# Patient Record
Sex: FEMALE | Race: WHITE | Employment: OTHER | URBAN - METROPOLITAN AREA
[De-identification: names, ages, dates, MRNs, and addresses within clinical notes are randomized per-mention and may not be internally consistent; named-entity substitution may affect disease eponyms.]

---

## 2017-06-26 ENCOUNTER — HOSPITAL ENCOUNTER (OUTPATIENT)
Dept: GENERAL RADIOLOGY | Age: 67
Discharge: HOME | End: 2017-06-26
Attending: FAMILY MEDICINE
Payer: MEDICARE

## 2017-06-26 DIAGNOSIS — M54.50 LUMBAGO: ICD-10-CM

## 2017-06-26 PROCEDURE — 72114 X-RAY EXAM L-S SPINE BENDING: CPT

## 2017-09-11 ENCOUNTER — HOSPITAL ENCOUNTER (OUTPATIENT)
Dept: GENERAL RADIOLOGY | Age: 67
Discharge: HOME | End: 2017-09-11
Attending: FAMILY MEDICINE
Payer: MEDICARE

## 2017-09-11 DIAGNOSIS — M25.561 RIGHT MEDIAL KNEE PAIN: ICD-10-CM

## 2017-09-11 PROCEDURE — 73562 X-RAY EXAM OF KNEE 3: CPT

## 2017-11-03 ENCOUNTER — APPOINTMENT (OUTPATIENT)
Dept: CT IMAGING | Age: 67
End: 2017-11-03
Attending: EMERGENCY MEDICINE
Payer: MEDICARE

## 2017-11-03 ENCOUNTER — HOSPITAL ENCOUNTER (EMERGENCY)
Age: 67
Discharge: HOME | End: 2017-11-04
Attending: EMERGENCY MEDICINE | Admitting: EMERGENCY MEDICINE
Payer: MEDICARE

## 2017-11-03 DIAGNOSIS — R41.82 ALTERED MENTAL STATUS, UNSPECIFIED ALTERED MENTAL STATUS TYPE: Primary | ICD-10-CM

## 2017-11-03 DIAGNOSIS — F10.929 ALCOHOLIC INTOXICATION WITH COMPLICATION (HCC): ICD-10-CM

## 2017-11-03 LAB
ALBUMIN SERPL-MCNC: 3.5 G/DL (ref 3.5–5)
ALBUMIN/GLOB SERPL: 1 {RATIO} (ref 1.1–2.2)
ALP SERPL-CCNC: 61 U/L (ref 45–117)
ALT SERPL-CCNC: 22 U/L (ref 12–78)
AMPHET UR QL SCN: NEGATIVE
ANION GAP SERPL CALC-SCNC: 5 MMOL/L (ref 5–15)
APAP SERPL-MCNC: <2 UG/ML (ref 10–30)
APPEARANCE UR: CLEAR
AST SERPL-CCNC: 20 U/L (ref 15–37)
BACTERIA URNS QL MICRO: NEGATIVE /HPF
BARBITURATES UR QL SCN: NEGATIVE
BASOPHILS # BLD: 0 K/UL (ref 0–0.1)
BASOPHILS NFR BLD: 0 % (ref 0–1)
BENZODIAZ UR QL: POSITIVE
BILIRUB SERPL-MCNC: 0.2 MG/DL (ref 0.2–1)
BILIRUB UR QL: NEGATIVE
BUN SERPL-MCNC: 5 MG/DL (ref 6–20)
BUN/CREAT SERPL: 9 (ref 12–20)
CALCIUM SERPL-MCNC: 8.3 MG/DL (ref 8.5–10.1)
CANNABINOIDS UR QL SCN: NEGATIVE
CHLORIDE SERPL-SCNC: 91 MMOL/L (ref 97–108)
CO2 SERPL-SCNC: 30 MMOL/L (ref 21–32)
COCAINE UR QL SCN: NEGATIVE
COLOR UR: ABNORMAL
CREAT SERPL-MCNC: 0.55 MG/DL (ref 0.55–1.02)
DRUG SCRN COMMENT,DRGCM: ABNORMAL
EOSINOPHIL # BLD: 0.2 K/UL (ref 0–0.4)
EOSINOPHIL NFR BLD: 2 % (ref 0–7)
EPITH CASTS URNS QL MICRO: ABNORMAL /LPF
ERYTHROCYTE [DISTWIDTH] IN BLOOD BY AUTOMATED COUNT: 12.8 % (ref 11.5–14.5)
ETHANOL SERPL-MCNC: 273 MG/DL
GLOBULIN SER CALC-MCNC: 3.5 G/DL (ref 2–4)
GLUCOSE SERPL-MCNC: 98 MG/DL (ref 65–100)
GLUCOSE UR STRIP.AUTO-MCNC: NEGATIVE MG/DL
HCT VFR BLD AUTO: 33.8 % (ref 35–47)
HGB BLD-MCNC: 12.1 G/DL (ref 11.5–16)
HGB UR QL STRIP: ABNORMAL
KETONES UR QL STRIP.AUTO: NEGATIVE MG/DL
LEUKOCYTE ESTERASE UR QL STRIP.AUTO: NEGATIVE
LYMPHOCYTES # BLD: 3.4 K/UL (ref 0.8–3.5)
LYMPHOCYTES NFR BLD: 47 % (ref 12–49)
MCH RBC QN AUTO: 33.2 PG (ref 26–34)
MCHC RBC AUTO-ENTMCNC: 35.8 G/DL (ref 30–36.5)
MCV RBC AUTO: 92.6 FL (ref 80–99)
METHADONE UR QL: NEGATIVE
MONOCYTES # BLD: 0.5 K/UL (ref 0–1)
MONOCYTES NFR BLD: 6 % (ref 5–13)
NEUTS SEG # BLD: 3.3 K/UL (ref 1.8–8)
NEUTS SEG NFR BLD: 45 % (ref 32–75)
NITRITE UR QL STRIP.AUTO: NEGATIVE
OPIATES UR QL: NEGATIVE
PCP UR QL: NEGATIVE
PH UR STRIP: 5.5 [PH] (ref 5–8)
PLATELET # BLD AUTO: 257 K/UL (ref 150–400)
POTASSIUM SERPL-SCNC: 4.6 MMOL/L (ref 3.5–5.1)
PROT SERPL-MCNC: 7 G/DL (ref 6.4–8.2)
PROT UR STRIP-MCNC: NEGATIVE MG/DL
RBC # BLD AUTO: 3.65 M/UL (ref 3.8–5.2)
RBC #/AREA URNS HPF: ABNORMAL /HPF (ref 0–5)
SALICYLATES SERPL-MCNC: <1.7 MG/DL (ref 2.8–20)
SODIUM SERPL-SCNC: 126 MMOL/L (ref 136–145)
SP GR UR REFRACTOMETRY: <1.005 (ref 1–1.03)
UR CULT HOLD, URHOLD: NORMAL
UROBILINOGEN UR QL STRIP.AUTO: 0.2 EU/DL (ref 0.2–1)
WBC # BLD AUTO: 7.4 K/UL (ref 3.6–11)
WBC URNS QL MICRO: ABNORMAL /HPF (ref 0–4)

## 2017-11-03 PROCEDURE — 36415 COLL VENOUS BLD VENIPUNCTURE: CPT | Performed by: EMERGENCY MEDICINE

## 2017-11-03 PROCEDURE — 99284 EMERGENCY DEPT VISIT MOD MDM: CPT

## 2017-11-03 PROCEDURE — 85025 COMPLETE CBC W/AUTO DIFF WBC: CPT | Performed by: EMERGENCY MEDICINE

## 2017-11-03 PROCEDURE — 81001 URINALYSIS AUTO W/SCOPE: CPT | Performed by: EMERGENCY MEDICINE

## 2017-11-03 PROCEDURE — 77030011943

## 2017-11-03 PROCEDURE — 80053 COMPREHEN METABOLIC PANEL: CPT | Performed by: EMERGENCY MEDICINE

## 2017-11-03 PROCEDURE — 51701 INSERT BLADDER CATHETER: CPT

## 2017-11-03 PROCEDURE — 80307 DRUG TEST PRSMV CHEM ANLYZR: CPT | Performed by: EMERGENCY MEDICINE

## 2017-11-03 PROCEDURE — 70450 CT HEAD/BRAIN W/O DYE: CPT

## 2017-11-03 RX ORDER — SODIUM CHLORIDE 0.9 % (FLUSH) 0.9 %
5-10 SYRINGE (ML) INJECTION EVERY 8 HOURS
Status: DISCONTINUED | OUTPATIENT
Start: 2017-11-03 | End: 2017-11-04 | Stop reason: HOSPADM

## 2017-11-03 RX ORDER — SODIUM CHLORIDE 0.9 % (FLUSH) 0.9 %
5-10 SYRINGE (ML) INJECTION AS NEEDED
Status: DISCONTINUED | OUTPATIENT
Start: 2017-11-03 | End: 2017-11-04 | Stop reason: HOSPADM

## 2017-11-04 VITALS
RESPIRATION RATE: 16 BRPM | BODY MASS INDEX: 27.32 KG/M2 | WEIGHT: 170 LBS | TEMPERATURE: 98.2 F | OXYGEN SATURATION: 92 % | SYSTOLIC BLOOD PRESSURE: 95 MMHG | HEIGHT: 66 IN | DIASTOLIC BLOOD PRESSURE: 57 MMHG | HEART RATE: 75 BPM

## 2017-11-04 NOTE — ED PROVIDER NOTES
HPI Comments: 75 y.o. female with past medical history significant for Hypercholesterolemia, Anxiety who presents from home with chief complaint of weakness. Pt reports 1 day hx of gradually worsening weakness to right leg accompanied by mild pain. She noticed symptoms were most prevelant tonight while ambulating to the restroom.  notes that pt was also slurring her speech. Pt states that she did attend a  today for her mother. She further admits to comsuming several glasses of wine today. Pt denies chest pain, SOB. There are no other acute medical concerns at this time. PCP: Georgia Badillo MD    Note written by Nasim Marie, as dictated by Jaz Haque MD 10:35 PM    The history is provided by the patient. No  was used. Past Medical History:   Diagnosis Date    Anxiety     High cholesterol        No past surgical history on file. No family history on file. Social History     Social History    Marital status:      Spouse name: N/A    Number of children: N/A    Years of education: N/A     Occupational History    Not on file. Social History Main Topics    Smoking status: Never Smoker    Smokeless tobacco: Not on file    Alcohol use Yes      Comment: wine    Drug use: Not on file    Sexual activity: Not on file     Other Topics Concern    Not on file     Social History Narrative    No narrative on file         ALLERGIES: Review of patient's allergies indicates no known allergies. Review of Systems   Constitutional: Negative for chills and fever. HENT: Negative for ear pain and sore throat. Eyes: Negative for photophobia and pain. Respiratory: Negative for chest tightness and shortness of breath. Cardiovascular: Negative for chest pain and leg swelling. Gastrointestinal: Negative for abdominal pain, nausea and vomiting. Genitourinary: Negative for dysuria and flank pain.    Musculoskeletal: Positive for myalgias (right leg). Negative for back pain and neck pain. Skin: Negative for rash and wound. Neurological: Positive for speech difficulty (slurred) and weakness (right leg). Negative for dizziness, light-headedness and headaches. Psychiatric/Behavioral:        Sad - she just buried her mother today       Vitals:    11/03/17 2156 11/03/17 2230   BP: (!) 140/115 124/73   Pulse: 79 73   Resp: 16 18   Temp: 98.2 °F (36.8 °C)    SpO2: 96% 95%   Weight: 77.1 kg (170 lb)    Height: 5' 6\" (1.676 m)             Physical Exam   Constitutional: She is oriented to person, place, and time. She appears well-developed and well-nourished. No distress. HENT:   Head: Normocephalic and atraumatic. Eyes: Conjunctivae and EOM are normal.   Neck: Normal range of motion. Cardiovascular: Normal rate, regular rhythm, normal heart sounds and intact distal pulses. No murmur heard. Pulmonary/Chest: Effort normal and breath sounds normal. No stridor. No respiratory distress. Abdominal: Soft. Bowel sounds are normal. There is no tenderness. Musculoskeletal: Normal range of motion. She exhibits no edema, tenderness or deformity. Neurological: She is alert and oriented to person, place, and time. No cranial nerve deficit. Coordination abnormal.   Patient having trouble holding her right leg in the air as well as the left. Skin: Skin is warm and dry. She is not diaphoretic. Psychiatric: Her speech is slurred. She exhibits a depressed mood. She expresses no suicidal plans and no homicidal plans. Nursing note and vitals reviewed. MDM  Number of Diagnoses or Management Options  Alcoholic intoxication with complication [facility]): Altered mental status, unspecified altered mental status type:   Diagnosis management comments: Patient appears intoxicated - check labs and head CT for altered mental status.     Unlikely this is a CVA or other structural problem,       Patient test results discussed and she takes valium (took some today) and her alcohol level was elevated       Amount and/or Complexity of Data Reviewed  Clinical lab tests: reviewed and ordered  Tests in the radiology section of CPT®: reviewed and ordered    Risk of Complications, Morbidity, and/or Mortality  Presenting problems: high  Diagnostic procedures: high  Management options: high    Patient Progress  Patient progress: stable    ED Course       Procedures           VITALS:   Patient Vitals for the past 8 hrs:   Temp Pulse Resp BP SpO2   11/04/17 0000 - 75 16 95/57 92 %   11/03/17 2345 - 73 16 95/58 92 %   11/03/17 2330 - 73 16 100/58 92 %   11/03/17 2315 - 75 17 107/62 95 %   11/03/17 2300 - 79 16 112/59 -   11/03/17 2245 - 72 20 109/64 90 %   11/03/17 2230 - 73 18 124/73 95 %   11/03/17 2215 - 77 17 128/80 95 %   11/03/17 2200 - 78 26 - 95 %   11/03/17 2156 98.2 °F (36.8 °C) 79 16 (!) 140/115 96 %                  Recent Results (from the past 24 hour(s))   CBC WITH AUTOMATED DIFF    Collection Time: 11/03/17 10:05 PM   Result Value Ref Range    WBC 7.4 3.6 - 11.0 K/uL    RBC 3.65 (L) 3.80 - 5.20 M/uL    HGB 12.1 11.5 - 16.0 g/dL    HCT 33.8 (L) 35.0 - 47.0 %    MCV 92.6 80.0 - 99.0 FL    MCH 33.2 26.0 - 34.0 PG    MCHC 35.8 30.0 - 36.5 g/dL    RDW 12.8 11.5 - 14.5 %    PLATELET 727 336 - 408 K/uL    NEUTROPHILS 45 32 - 75 %    LYMPHOCYTES 47 12 - 49 %    MONOCYTES 6 5 - 13 %    EOSINOPHILS 2 0 - 7 %    BASOPHILS 0 0 - 1 %    ABS. NEUTROPHILS 3.3 1.8 - 8.0 K/UL    ABS. LYMPHOCYTES 3.4 0.8 - 3.5 K/UL    ABS. MONOCYTES 0.5 0.0 - 1.0 K/UL    ABS. EOSINOPHILS 0.2 0.0 - 0.4 K/UL    ABS.  BASOPHILS 0.0 0.0 - 0.1 K/UL   ETHYL ALCOHOL    Collection Time: 11/03/17 10:05 PM   Result Value Ref Range    ALCOHOL(ETHYL),SERUM 273 (H) <40 MG/DL   METABOLIC PANEL, COMPREHENSIVE    Collection Time: 11/03/17 10:05 PM   Result Value Ref Range    Sodium 126 (L) 136 - 145 mmol/L    Potassium 4.6 3.5 - 5.1 mmol/L    Chloride 91 (L) 97 - 108 mmol/L    CO2 30 21 - 32 mmol/L Anion gap 5 5 - 15 mmol/L    Glucose 98 65 - 100 mg/dL    BUN 5 (L) 6 - 20 MG/DL    Creatinine 0.55 0.55 - 1.02 MG/DL    BUN/Creatinine ratio 9 (L) 12 - 20      GFR est AA >60 >60 ml/min/1.73m2    GFR est non-AA >60 >60 ml/min/1.73m2    Calcium 8.3 (L) 8.5 - 10.1 MG/DL    Bilirubin, total 0.2 0.2 - 1.0 MG/DL    ALT (SGPT) 22 12 - 78 U/L    AST (SGOT) 20 15 - 37 U/L    Alk.  phosphatase 61 45 - 117 U/L    Protein, total 7.0 6.4 - 8.2 g/dL    Albumin 3.5 3.5 - 5.0 g/dL    Globulin 3.5 2.0 - 4.0 g/dL    A-G Ratio 1.0 (L) 1.1 - 2.2     SALICYLATE    Collection Time: 11/03/17 10:05 PM   Result Value Ref Range    Salicylate level <4.3 (L) 2.8 - 20.0 MG/DL   ACETAMINOPHEN    Collection Time: 11/03/17 10:05 PM   Result Value Ref Range    Acetaminophen level <2 (L) 10 - 30 ug/mL   DRUG SCREEN, URINE    Collection Time: 11/03/17 10:24 PM   Result Value Ref Range    AMPHETAMINES NEGATIVE  NEG      BARBITURATES NEGATIVE  NEG      BENZODIAZEPINES POSITIVE (A) NEG      COCAINE NEGATIVE  NEG      METHADONE NEGATIVE  NEG      OPIATES NEGATIVE  NEG      PCP(PHENCYCLIDINE) NEGATIVE  NEG      THC (TH-CANNABINOL) NEGATIVE  NEG      Drug screen comment (NOTE)    URINALYSIS W/MICROSCOPIC    Collection Time: 11/03/17 10:24 PM   Result Value Ref Range    Color YELLOW/STRAW      Appearance CLEAR CLEAR      Specific gravity <1.005 1.003 - 1.030    pH (UA) 5.5 5.0 - 8.0      Protein NEGATIVE  NEG mg/dL    Glucose NEGATIVE  NEG mg/dL    Ketone NEGATIVE  NEG mg/dL    Bilirubin NEGATIVE  NEG      Blood MODERATE (A) NEG      Urobilinogen 0.2 0.2 - 1.0 EU/dL    Nitrites NEGATIVE  NEG      Leukocyte Esterase NEGATIVE  NEG      WBC 0-4 0 - 4 /hpf    RBC 0-5 0 - 5 /hpf    Epithelial cells FEW FEW /lpf    Bacteria NEGATIVE  NEG /hpf   URINE CULTURE HOLD SAMPLE    Collection Time: 11/03/17 10:24 PM   Result Value Ref Range    Urine culture hold URINE ON HOLD IN MICROBIOLOGY DEPT FOR 3 DAYS         Ct Head Wo Cont    Result Date: 11/3/2017  EXAM:  CT HEAD WO CONT INDICATION:   right leg weakness COMPARISON: None. CONTRAST:  None. TECHNIQUE: Unenhanced CT of the head was performed using 5 mm images. Brain and bone windows were generated. CT dose reduction was achieved through use of a standardized protocol tailored for this examination and automatic exposure control for dose modulation. FINDINGS: There is no extra-axial fluid collection, hemorrhage shift or masses her. Mild atrophy and white matter disease. impression: No acute changes.

## 2017-11-04 NOTE — ED TRIAGE NOTES
Complains of LLE pain after [place] , denies numbness, tingling.   Per pt she had four glasses of wine this afternoon and per her  he does not think she is prounouncing words correctly

## 2017-11-04 NOTE — DISCHARGE INSTRUCTIONS
VITALS:   Patient Vitals for the past 8 hrs:   Temp Pulse Resp BP SpO2   11/03/17 2230 - 73 18 124/73 95 %   11/03/17 2156 98.2 °F (36.8 °C) 79 16 (!) 140/115 96 %                  Recent Results (from the past 24 hour(s))   CBC WITH AUTOMATED DIFF    Collection Time: 11/03/17 10:05 PM   Result Value Ref Range    WBC 7.4 3.6 - 11.0 K/uL    RBC 3.65 (L) 3.80 - 5.20 M/uL    HGB 12.1 11.5 - 16.0 g/dL    HCT 33.8 (L) 35.0 - 47.0 %    MCV 92.6 80.0 - 99.0 FL    MCH 33.2 26.0 - 34.0 PG    MCHC 35.8 30.0 - 36.5 g/dL    RDW 12.8 11.5 - 14.5 %    PLATELET 562 351 - 347 K/uL    NEUTROPHILS 45 32 - 75 %    LYMPHOCYTES 47 12 - 49 %    MONOCYTES 6 5 - 13 %    EOSINOPHILS 2 0 - 7 %    BASOPHILS 0 0 - 1 %    ABS. NEUTROPHILS 3.3 1.8 - 8.0 K/UL    ABS. LYMPHOCYTES 3.4 0.8 - 3.5 K/UL    ABS. MONOCYTES 0.5 0.0 - 1.0 K/UL    ABS. EOSINOPHILS 0.2 0.0 - 0.4 K/UL    ABS. BASOPHILS 0.0 0.0 - 0.1 K/UL   ETHYL ALCOHOL    Collection Time: 11/03/17 10:05 PM   Result Value Ref Range    ALCOHOL(ETHYL),SERUM 273 (H) <68 MG/DL   METABOLIC PANEL, COMPREHENSIVE    Collection Time: 11/03/17 10:05 PM   Result Value Ref Range    Sodium 126 (L) 136 - 145 mmol/L    Potassium 4.6 3.5 - 5.1 mmol/L    Chloride 91 (L) 97 - 108 mmol/L    CO2 30 21 - 32 mmol/L    Anion gap 5 5 - 15 mmol/L    Glucose 98 65 - 100 mg/dL    BUN 5 (L) 6 - 20 MG/DL    Creatinine 0.55 0.55 - 1.02 MG/DL    BUN/Creatinine ratio 9 (L) 12 - 20      GFR est AA >60 >60 ml/min/1.73m2    GFR est non-AA >60 >60 ml/min/1.73m2    Calcium 8.3 (L) 8.5 - 10.1 MG/DL    Bilirubin, total 0.2 0.2 - 1.0 MG/DL    ALT (SGPT) 22 12 - 78 U/L    AST (SGOT) 20 15 - 37 U/L    Alk.  phosphatase 61 45 - 117 U/L    Protein, total 7.0 6.4 - 8.2 g/dL    Albumin 3.5 3.5 - 5.0 g/dL    Globulin 3.5 2.0 - 4.0 g/dL    A-G Ratio 1.0 (L) 1.1 - 2.2     SALICYLATE    Collection Time: 11/03/17 10:05 PM   Result Value Ref Range    Salicylate level <5.0 (L) 2.8 - 20.0 MG/DL   ACETAMINOPHEN    Collection Time: 11/03/17 10:05 PM   Result Value Ref Range    Acetaminophen level <2 (L) 10 - 30 ug/mL   DRUG SCREEN, URINE    Collection Time: 11/03/17 10:24 PM   Result Value Ref Range    AMPHETAMINES NEGATIVE  NEG      BARBITURATES NEGATIVE  NEG      BENZODIAZEPINES POSITIVE (A) NEG      COCAINE NEGATIVE  NEG      METHADONE NEGATIVE  NEG      OPIATES NEGATIVE  NEG      PCP(PHENCYCLIDINE) NEGATIVE  NEG      THC (TH-CANNABINOL) NEGATIVE  NEG      Drug screen comment (NOTE)    URINALYSIS W/MICROSCOPIC    Collection Time: 11/03/17 10:24 PM   Result Value Ref Range    Color YELLOW/STRAW      Appearance CLEAR CLEAR      Specific gravity <1.005 1.003 - 1.030    pH (UA) 5.5 5.0 - 8.0      Protein NEGATIVE  NEG mg/dL    Glucose NEGATIVE  NEG mg/dL    Ketone NEGATIVE  NEG mg/dL    Bilirubin NEGATIVE  NEG      Blood MODERATE (A) NEG      Urobilinogen 0.2 0.2 - 1.0 EU/dL    Nitrites NEGATIVE  NEG      Leukocyte Esterase NEGATIVE  NEG      WBC 0-4 0 - 4 /hpf    RBC 0-5 0 - 5 /hpf    Epithelial cells FEW FEW /lpf    Bacteria NEGATIVE  NEG /hpf   URINE CULTURE HOLD SAMPLE    Collection Time: 11/03/17 10:24 PM   Result Value Ref Range    Urine culture hold URINE ON HOLD IN MICROBIOLOGY DEPT FOR 3 DAYS         Ct Head Wo Cont    Result Date: 11/3/2017  EXAM:  CT HEAD WO CONT INDICATION:   right leg weakness COMPARISON: None. CONTRAST:  None. TECHNIQUE: Unenhanced CT of the head was performed using 5 mm images. Brain and bone windows were generated. CT dose reduction was achieved through use of a standardized protocol tailored for this examination and automatic exposure control for dose modulation. FINDINGS: There is no extra-axial fluid collection, hemorrhage shift or masses her. Mild atrophy and white matter disease. impression: No acute changes.

## 2017-11-04 NOTE — ED NOTES
Bedside shift change report given to Arnold Schultz (oncoming nurse) by Lorie Bailey (offgoing nurse). Report included the following information SBAR, Kardex, ED Summary, MAR, Recent Results and Med Rec Status.

## 2018-12-14 ENCOUNTER — HOSPITAL ENCOUNTER (OUTPATIENT)
Dept: GENERAL RADIOLOGY | Age: 68
Discharge: HOME | End: 2018-12-14
Attending: PHYSICAL MEDICINE & REHABILITATION
Payer: MEDICARE

## 2018-12-14 DIAGNOSIS — R52 PAIN: ICD-10-CM

## 2018-12-14 PROCEDURE — 72072 X-RAY EXAM THORAC SPINE 3VWS: CPT

## 2018-12-14 PROCEDURE — 72050 X-RAY EXAM NECK SPINE 4/5VWS: CPT

## 2019-01-07 ENCOUNTER — HOSPITAL ENCOUNTER (OUTPATIENT)
Dept: MRI IMAGING | Age: 69
Discharge: HOME | End: 2019-01-07
Attending: PHYSICAL MEDICINE & REHABILITATION
Payer: MEDICARE

## 2019-01-07 DIAGNOSIS — M54.50 LOW BACK PAIN: ICD-10-CM

## 2019-01-07 PROCEDURE — 72148 MRI LUMBAR SPINE W/O DYE: CPT

## 2020-09-22 ENCOUNTER — HOSPITAL ENCOUNTER (OUTPATIENT)
Dept: CT IMAGING | Age: 70
Discharge: HOME | End: 2020-09-22
Attending: FAMILY MEDICINE
Payer: MEDICARE

## 2020-09-22 DIAGNOSIS — R51.9 FRONTAL HEADACHE: ICD-10-CM

## 2020-09-22 PROCEDURE — 70450 CT HEAD/BRAIN W/O DYE: CPT

## 2021-01-29 ENCOUNTER — OFFICE VISIT (OUTPATIENT)
Dept: NEUROLOGY | Age: 71
End: 2021-01-29
Payer: MEDICARE

## 2021-01-29 VITALS
HEIGHT: 66 IN | RESPIRATION RATE: 18 BRPM | DIASTOLIC BLOOD PRESSURE: 70 MMHG | SYSTOLIC BLOOD PRESSURE: 122 MMHG | WEIGHT: 166 LBS | TEMPERATURE: 97.7 F | BODY MASS INDEX: 26.68 KG/M2

## 2021-01-29 DIAGNOSIS — G25.0 ESSENTIAL TREMOR: Primary | ICD-10-CM

## 2021-01-29 PROCEDURE — 3017F COLORECTAL CA SCREEN DOC REV: CPT | Performed by: PSYCHIATRY & NEUROLOGY

## 2021-01-29 PROCEDURE — G8419 CALC BMI OUT NRM PARAM NOF/U: HCPCS | Performed by: PSYCHIATRY & NEUROLOGY

## 2021-01-29 PROCEDURE — G8427 DOCREV CUR MEDS BY ELIG CLIN: HCPCS | Performed by: PSYCHIATRY & NEUROLOGY

## 2021-01-29 PROCEDURE — 1090F PRES/ABSN URINE INCON ASSESS: CPT | Performed by: PSYCHIATRY & NEUROLOGY

## 2021-01-29 PROCEDURE — 1101F PT FALLS ASSESS-DOCD LE1/YR: CPT | Performed by: PSYCHIATRY & NEUROLOGY

## 2021-01-29 PROCEDURE — G8400 PT W/DXA NO RESULTS DOC: HCPCS | Performed by: PSYCHIATRY & NEUROLOGY

## 2021-01-29 PROCEDURE — G8432 DEP SCR NOT DOC, RNG: HCPCS | Performed by: PSYCHIATRY & NEUROLOGY

## 2021-01-29 PROCEDURE — 99204 OFFICE O/P NEW MOD 45 MIN: CPT | Performed by: PSYCHIATRY & NEUROLOGY

## 2021-01-29 PROCEDURE — G8536 NO DOC ELDER MAL SCRN: HCPCS | Performed by: PSYCHIATRY & NEUROLOGY

## 2021-01-29 RX ORDER — TOPIRAMATE 25 MG/1
TABLET ORAL
Qty: 42 TAB | Refills: 0 | Status: SHIPPED | OUTPATIENT
Start: 2021-01-29 | End: 2021-05-04 | Stop reason: DRUGHIGH

## 2021-01-29 RX ORDER — TOPIRAMATE 100 MG/1
100 TABLET, FILM COATED ORAL
Qty: 30 TAB | Refills: 3 | Status: SHIPPED | OUTPATIENT
Start: 2021-01-29 | End: 2021-05-04 | Stop reason: SDUPTHER

## 2021-01-29 RX ORDER — ACYCLOVIR 200 MG/1
200 CAPSULE ORAL
COMMUNITY

## 2021-01-29 NOTE — PROGRESS NOTES
[facility] and [address] at [facility] at [address] [facility], [address] [address] [id number] [address] [facility], [address] [place] [address]  [phone] Office  [phone] Facsimile           Referring: Chadd Landon MD      Chief Complaint   Patient presents with    Tremors     hands    59-year-old right-handed lady who presents today for initial neurologic consultation regarding tremor. She notes that she really has become bothered by this tremor over the last couple of months. No changes in medication, illness, injury. She describes tremor in her hands when she is doing something. It tends to get worse during the day. Worse with anxiety. She notices it a good deal if she holds her stylus for her phone. No difficulty with make-up. No head tremor. No vocal tremor. Her  has Parkinson's. No family history of tremor. She does have anxiety and notes if she takes a Valium which she does on rare occasion it will make the tremor better. She does not drink anymore so she does not know how alcohol affects. No rest tremor. She does have myoclonus at night particularly and uses trazodone at bedtime. Record review finds no recent documentation in our electronic medical record    CT scan of the head performed September 22, 2020 is personally reviewed.   This is normal.  Past Medical History:   Diagnosis Date    Adverse effect of anesthesia     per PCP patient had bladder retention after anesthesia with skin cancer surgery, under treatment with urologist. 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  BETHANECHOL CHLORIDE 25 MG TABS (URECHOLINE) Take 25 mg by mouth two (2) times a day.  omeprazole (PRILOSEC) 40 mg capsule Take 40 mg by mouth daily.  lisinopril (PRINIVIL, ZESTRIL) 10 mg tablet Take 5 mg by mouth daily.  traZODone (DESYREL) 100 mg tablet Take 100 mg by mouth nightly. Indications: insomnia      diazePAM (VALIUM) 5 mg tablet Take 10 mg by mouth every twelve (12) hours as needed for Anxiety.  escitalopram oxalate (LEXAPRO) 10 mg tablet Take 10 mg by mouth daily.  latanoprost (XALATAN) 0.005 % ophthalmic solution Administer 1 Drop to both eyes nightly.  peg 400-propylene glycol (SYSTANE, PROPYLENE GLYCOL,) 0.4-0.3 % drop Administer 1 Drop to both eyes as needed.  denosumab (PROLIA) 60 mg/mL injection 60 mg by SubCUTAneous route every 6 months. Indications: POST-MENOPAUSAL OSTEOPOROSIS      human papillomavirus vaccine QV, PF, (GARDASIL, PF,) 20-40-40-20 mcg/0.5 mL injection 0.5 mL by IntraMUSCular route once. Allergies   Allergen Reactions    Ciprofibrate Myalgia and Other (comments)     Sore and aching all over       Social History     Tobacco Use    Smoking status: Never Smoker    Smokeless tobacco: Never Used   Substance Use Topics    Alcohol use: Yes     Alcohol/week: 14.0 standard drinks     Types: 14 Glasses of wine per week     Comment: wine    Drug use: Not on file       No family history on file.     Review of Systems  Pertinent positives and negatives as noted with remainder of comprehensive review negative      Examination  Visit Vitals  Temp 97.7 °F (36.5 °C) Ht 5' 6\" (1.676 m)   Wt 75.3 kg (166 lb)   BMI 26.79 kg/m²     Visit Vitals  /70   Temp 97.7 °F (36.5 °C)   Resp 18   Ht 5' 6\" (1.676 m)   Wt 75.3 kg (166 lb)   BMI 26.79 kg/m²     Pleasant lady. No icterus. No edema. Appropriately dressed and appropriately groomed and she is engaging and interactive. Cranial nerves intact 2-12 with the exception of markedly decreased vision in the left eye and she notes she is legally blind from central  occlusion. No nystagmus. No pronation or drift. Postural tremor of the outstretched hands. Intention on finger-nose-finger. No rest tremor. No cogwheeling. Writing sample finds quiver with spiral as well as regular handwriting. Strength is full throughout. Reflexes symmetrical.  No pathological reflexes. No ataxia. Impression/Plan  Essential tremor. Discussed the difference between Parkinson's disease and essential tremor. Discussed the benign but bothersome course. Discussed potential treatment modalities  Including Mysoline and Inderal as well as their potential side effects. Discussed also this is a disorder that does not need to be treated unless bothersome enough to take a daily medication and the medications do nothing but treat the symptoms, not the disorder--ie no cure. She would like to try medication as this is quite bothersome to her. After review of medications, history of blood pressure etc. we will try Topamax. Would like to defer Mysoline this point. Also would like to defer beta-blocker. Titrate in a customary fashion 200 mg nightly. Discussed potential side effects including taste disturbance, tingling in the extremities and weight loss. Follow in 8 weeks    Velta Burkitt, MD          This note was created using voice recognition software. Despite editing, there may be syntax errors.

## 2021-01-29 NOTE — PATIENT INSTRUCTIONS
To help your tremor, start taking Topamax 25 mg at bedtime for 1 week then 50 mg at bedtime for 1 week then 75 mg at bedtime for 1 week then start taking 100 mg nightly thereafter    Maintain 100 mg nightly of Topamax until we get back together in about 8 weeks and at that time we will determine if we need to increase the dose or maintain

## 2021-02-23 ENCOUNTER — TELEPHONE (OUTPATIENT)
Dept: NEUROLOGY | Age: 71
End: 2021-02-23

## 2021-02-23 NOTE — TELEPHONE ENCOUNTER
----- Message from [facility] sent at 2/22/2021  4:18 PM EST -----  Regarding: Dr. Perez Nicole first and last name:n/a  Reason for call:Pt wants to know if fax  request for \"Topirmate \" 100 mg was received and wanted to know if she needs an office visit or if RX can be sent to [address] [place] [address]. Pt also said this Rx has improved her tremors significantly.   Callback required yes/no and why: yes  Best contact number(s): [phone]  Details to clarify the request: n/a

## 2021-02-23 NOTE — TELEPHONE ENCOUNTER
S/w pt, notified tpx 100 mg was sent and rec'd at [facility] Receipt confirmed by pharmacy (1/29/2021  9:32 AM EST)

## 2021-04-14 ENCOUNTER — HOSPITAL ENCOUNTER (OUTPATIENT)
Dept: GENERAL RADIOLOGY | Age: 71
Discharge: HOME | End: 2021-04-14
Payer: MEDICARE

## 2021-04-14 ENCOUNTER — TRANSCRIBE ORDER (OUTPATIENT)
Dept: MRI IMAGING | Age: 71
End: 2021-04-14

## 2021-04-14 DIAGNOSIS — R06.02 SHORTNESS OF BREATH: ICD-10-CM

## 2021-04-14 DIAGNOSIS — R06.02 SHORTNESS OF BREATH: Primary | ICD-10-CM

## 2021-04-14 PROCEDURE — 71046 X-RAY EXAM CHEST 2 VIEWS: CPT

## 2021-05-04 ENCOUNTER — OFFICE VISIT (OUTPATIENT)
Dept: NEUROLOGY | Age: 71
End: 2021-05-04
Payer: MEDICARE

## 2021-05-04 VITALS
RESPIRATION RATE: 16 BRPM | BODY MASS INDEX: 26.68 KG/M2 | HEIGHT: 66 IN | WEIGHT: 166 LBS | DIASTOLIC BLOOD PRESSURE: 70 MMHG | OXYGEN SATURATION: 97 % | HEART RATE: 75 BPM | SYSTOLIC BLOOD PRESSURE: 109 MMHG

## 2021-05-04 DIAGNOSIS — G25.0 ESSENTIAL TREMOR: Primary | ICD-10-CM

## 2021-05-04 PROCEDURE — 1101F PT FALLS ASSESS-DOCD LE1/YR: CPT | Performed by: PSYCHIATRY & NEUROLOGY

## 2021-05-04 PROCEDURE — G8510 SCR DEP NEG, NO PLAN REQD: HCPCS | Performed by: PSYCHIATRY & NEUROLOGY

## 2021-05-04 PROCEDURE — G8419 CALC BMI OUT NRM PARAM NOF/U: HCPCS | Performed by: PSYCHIATRY & NEUROLOGY

## 2021-05-04 PROCEDURE — 1090F PRES/ABSN URINE INCON ASSESS: CPT | Performed by: PSYCHIATRY & NEUROLOGY

## 2021-05-04 PROCEDURE — G8400 PT W/DXA NO RESULTS DOC: HCPCS | Performed by: PSYCHIATRY & NEUROLOGY

## 2021-05-04 PROCEDURE — G8427 DOCREV CUR MEDS BY ELIG CLIN: HCPCS | Performed by: PSYCHIATRY & NEUROLOGY

## 2021-05-04 PROCEDURE — 3017F COLORECTAL CA SCREEN DOC REV: CPT | Performed by: PSYCHIATRY & NEUROLOGY

## 2021-05-04 PROCEDURE — G8536 NO DOC ELDER MAL SCRN: HCPCS | Performed by: PSYCHIATRY & NEUROLOGY

## 2021-05-04 PROCEDURE — 99213 OFFICE O/P EST LOW 20 MIN: CPT | Performed by: PSYCHIATRY & NEUROLOGY

## 2021-05-04 RX ORDER — GABAPENTIN 300 MG/1
CAPSULE ORAL
COMMUNITY
Start: 2021-04-12

## 2021-05-04 RX ORDER — TOPIRAMATE 100 MG/1
100 TABLET, FILM COATED ORAL
Qty: 90 TAB | Refills: 1 | Status: SHIPPED | OUTPATIENT
Start: 2021-05-04 | End: 2022-02-02

## 2021-05-04 NOTE — PROGRESS NOTES
[facility] and [address] at [facility] at [address] [facility], [address] [facility] [address] [facility], [address] [place] [address]   [phone]              Chief Complaint   Patient presents with   [facility] Tremors     doing very well     Current Outpatient Medications   Medication Sig Dispense Refill    topiramate (Topamax) 100 mg tablet Take 1 Tab by mouth nightly. 90 Tab 1    acyclovir (ZOVIRAX) 200 mg capsule Take  by mouth every four (4) hours (while awake).  BETHANECHOL CHLORIDE 25 MG TABS (URECHOLINE) Take 25 mg by mouth two (2) times a day.  omeprazole (PRILOSEC) 40 mg capsule Take 40 mg by mouth daily.  lisinopril (PRINIVIL, ZESTRIL) 10 mg tablet Take 5 mg by mouth daily.  traZODone (DESYREL) 100 mg tablet Take 100 mg by mouth nightly. Indications: insomnia      diazePAM (VALIUM) 5 mg tablet Take 10 mg by mouth every twelve (12) hours as needed for Anxiety.  escitalopram oxalate (LEXAPRO) 10 mg tablet Take 10 mg by mouth daily.  latanoprost (XALATAN) 0.005 % ophthalmic solution Administer 1 Drop to both eyes nightly.  peg 400-propylene glycol (SYSTANE, PROPYLENE GLYCOL,) 0.4-0.3 % drop Administer 1 Drop to both eyes as needed.  denosumab (PROLIA) 60 mg/mL injection 60 mg by SubCUTAneous route every 6 months. Indications: POST-MENOPAUSAL OSTEOPOROSIS      human papillomavirus vaccine QV, PF, (GARDASIL, PF,) 20-40-40-20 mcg/0.5 mL injection 0.5 mL by IntraMUSCular route once.  gabapentin (NEURONTIN) 300 mg capsule TAKE 1 CAPSULE BY MOUTH THREE TIMES DAILY        Allergies   Allergen Reactions    Ciprofibrate Myalgia and Other (comments)     Sore and aching all over    Diclofenac Nausea and Vomiting     Social History     Tobacco Use    Smoking status: Never Smoker    Smokeless tobacco: Never Used   Substance Use Topics    Alcohol use:  Yes     Alcohol/week: 14.0 standard drinks     Types: 14 Glasses of wine per week     Comment: wine    Drug use: Not on file     69-year-old lady returns today for follow-up essential tremor. Today she comes after initial consultation where we were titrating max. We subsequently titrated up to a dose of 100 mg nightly. She is tolerating that without difficulty. Happy with how her tremor is doing. Notes that she can use her stylus when she is checking her email and she is quite happy. Dr. Africa Rojas will be doing a spinal fusion with her in the next 30-60 days    Examination  Visit Vitals  /70 (BP 1 Location: Left upper arm, BP Patient Position: Sitting, BP Cuff Size: Adult)   Pulse 75   Resp 16   Ht 5' 6\" (1.676 m)   Wt 75.3 kg (166 lb)   SpO2 97%   BMI 26.79 kg/m²   She is a very pleasant lady is awake alert oriented and conversant. Speech and language normal.  Cognition normal.  Cranial nerves intact. No visible tremor today. No intention. She is able to mimic the use of a stylus in the office today. No tremor with drawing a spiral.    Impression/Plan  Essential tremor doing well. Continue Topamax 100 mg nightly. Follow in the fall unless circumstances dictate otherwise    Art Vasquez MD      This note was created using voice recognition software. Despite editing, there may be syntax errors.

## 2021-05-11 ENCOUNTER — TELEPHONE (OUTPATIENT)
Dept: NEUROLOGY | Age: 71
End: 2021-05-11

## 2021-05-11 RX ORDER — ROSUVASTATIN CALCIUM 10 MG/1
10 TABLET, COATED ORAL
COMMUNITY

## 2021-05-11 NOTE — TELEPHONE ENCOUNTER
Called patient she was concerned that care gaps were not filled out properly( her PCP is not in the [employer] system). Her biggest issue was that she was going to be getting surgery and she felt that the information needed to be updated. I updated what I was able to in the Care gaps, I also placed a note in her chart and updated her medication based on patient information as she stated that some of the medication information was incorrect.

## 2021-05-11 NOTE — TELEPHONE ENCOUNTER
----- Message from Major Persaud sent at 5/11/2021 11:07 AM EDT -----  Regarding: Dr. Evelia Pinzon Message/Vendor Calls    Caller's first and last name: Pt      Reason for call: incorrect information on AVS (per pt)      Callback required yes/no and why: Yes      Best contact number(s): [phone]      Details to clarify the request: Pt is requesting to speak w/ someone in the office regarding her AVS from her last appt on 05/04/21 with Dr. Silvia Delatorre. She states there is incorrect information on the report she was given following her appt with Dr. Silvia Delatorre on 05/04/21. She states this is extremely urgent. She has a pending spinal surgery that this information may negatively effect. She is requesting a call back.        Major Persaud

## 2021-05-11 NOTE — PROGRESS NOTES
Patient called concerning screening per Patient she received her COVID shot - 1st dose on 2/25/21 and her second  Moderna shot March 2021. She no longer has to complete breast examinations due to her age. Shingrix vaccination was done on 10/12/2012    Her annual wellness was completed on 2/11/2021    Colorectal screening was completed on 11/17/2014 and was to be done again in 10 years. I attempted to update the screening, however I am not a PCP office and for the most part had to just notes and add it to this addendum. Updated alcohol use as the patient states that she no longer drinks any alcoholic beverages. I have also updated her medication list as she states that their were mistakes in that as well. I can not add anything else to her care gaps as it does not allow me to edit it.

## 2022-02-22 ENCOUNTER — OFFICE VISIT (OUTPATIENT)
Dept: NEUROLOGY | Age: 72
End: 2022-02-22
Payer: MEDICARE

## 2022-02-22 VITALS
BODY MASS INDEX: 26.79 KG/M2 | RESPIRATION RATE: 18 BRPM | HEART RATE: 83 BPM | WEIGHT: 166 LBS | SYSTOLIC BLOOD PRESSURE: 122 MMHG | OXYGEN SATURATION: 98 % | DIASTOLIC BLOOD PRESSURE: 80 MMHG

## 2022-02-22 DIAGNOSIS — F10.20 ALCOHOLISM (HCC): Primary | ICD-10-CM

## 2022-02-22 DIAGNOSIS — G25.0 ESSENTIAL TREMOR: ICD-10-CM

## 2022-02-22 PROCEDURE — 1101F PT FALLS ASSESS-DOCD LE1/YR: CPT | Performed by: PSYCHIATRY & NEUROLOGY

## 2022-02-22 PROCEDURE — 1090F PRES/ABSN URINE INCON ASSESS: CPT | Performed by: PSYCHIATRY & NEUROLOGY

## 2022-02-22 PROCEDURE — G8536 NO DOC ELDER MAL SCRN: HCPCS | Performed by: PSYCHIATRY & NEUROLOGY

## 2022-02-22 PROCEDURE — G8427 DOCREV CUR MEDS BY ELIG CLIN: HCPCS | Performed by: PSYCHIATRY & NEUROLOGY

## 2022-02-22 PROCEDURE — 99214 OFFICE O/P EST MOD 30 MIN: CPT | Performed by: PSYCHIATRY & NEUROLOGY

## 2022-02-22 PROCEDURE — 3017F COLORECTAL CA SCREEN DOC REV: CPT | Performed by: PSYCHIATRY & NEUROLOGY

## 2022-02-22 PROCEDURE — G8419 CALC BMI OUT NRM PARAM NOF/U: HCPCS | Performed by: PSYCHIATRY & NEUROLOGY

## 2022-02-22 PROCEDURE — G8432 DEP SCR NOT DOC, RNG: HCPCS | Performed by: PSYCHIATRY & NEUROLOGY

## 2022-02-22 PROCEDURE — G8399 PT W/DXA RESULTS DOCUMENT: HCPCS | Performed by: PSYCHIATRY & NEUROLOGY

## 2022-02-22 NOTE — PROGRESS NOTES
[facility] and [address] at [facility] at [address], [address] [facility] [address] [facility], [address] [place] [address]   [phone]              Chief Complaint   Patient presents with    Tremors     Current Outpatient Medications   Medication Sig Dispense Refill    topiramate (TOPAMAX) 100 mg tablet Take 1 tablet by mouth nightly 30 Tablet 5    rosuvastatin (Crestor) 10 mg tablet Take 10 mg by mouth nightly.  gabapentin (NEURONTIN) 300 mg capsule TAKE 1 CAPSULE BY MOUTH THREE TIMES DAILY      acyclovir (ZOVIRAX) 200 mg capsule Take 200 mg by mouth. Indications: per patient she takes 1 daily      BETHANECHOL CHLORIDE 25 MG TABS (URECHOLINE) Take 25 mg by mouth two (2) times a day.  omeprazole (PRILOSEC) 40 mg capsule Take 40 mg by mouth daily.  lisinopril (PRINIVIL, ZESTRIL) 10 mg tablet Take 5 mg by mouth daily.  traZODone (DESYREL) 100 mg tablet Take 100 mg by mouth nightly. Indications: insomnia      diazePAM (VALIUM) 5 mg tablet Take 10 mg by mouth every twelve (12) hours as needed for Anxiety. Indications: Per Patient she takes 0.5 tablet as needed      escitalopram oxalate (LEXAPRO) 10 mg tablet Take 10 mg by mouth daily. Indications: Patient is NOT TAKING MEDICATION      latanoprost (XALATAN) 0.005 % ophthalmic solution Administer 1 Drop to both eyes nightly.  peg 400-propylene glycol (SYSTANE, PROPYLENE GLYCOL,) 0.4-0.3 % drop Administer 1 Drop to both eyes as needed.  denosumab (PROLIA) 60 mg/mL injection 60 mg by SubCUTAneous route every 6 months.  Indications: POST-MENOPAUSAL OSTEOPOROSIS        Allergies   Allergen Reactions    Ciprofibrate Myalgia and Other (comments)     Sore and aching all over    Diclofenac Nausea and Vomiting     Social History     Tobacco Use    Smoking status: Never Smoker    Smokeless tobacco: Never Used   Substance Use Topics  Alcohol use: Not Currently     Alcohol/week: 0.0 standard drinks     Comment: none    Drug use: Not on file   65-year-old lady returns today for follow-up essential tremor. She was last seen May 2021 and she was maintained on Topamax 100 mg. Today she reports the tremor is doing well. She is here with her  who provides history as well. New is that of her alcoholism. [facility]. She had surgery in September and did not have any alcohol until December.  notes that she would have airline size bottles and hide them in the car and starting at 11 AM would go out sneaking drinks. He now has restricted that to 4 bottles (Airline bottle size) a day and he does not give her her first 1 until 4 PM.  He notes that after she has had 2 drinks that she does not recall how many she has had. At times she will get very confused when she drinks and he actually had to call EMS one time last year because of that. They were hoping to enroll in our alcohol cessation program and I discussed with him that we do not do that here but would certainly be happy to refer      Examination  Visit Vitals  /80 (BP 1 Location: Left upper arm, BP Patient Position: Sitting, BP Cuff Size: Adult)   Pulse 83   Resp 18   Wt 75.3 kg (166 lb)   SpO2 98%   BMI 26.79 kg/m²     Pleasant lady. Awake alert and oriented. Normal speech and language. No tremor today visible. No intention. Impression/Plan  Essential tremor doing well  Continue Topamax    Alcoholism  Will refer to behavioral health    Follow with me in 6 months    Armando Galicia MD        This note was created using voice recognition software. Despite editing, there may be syntax errors.

## 2022-02-22 NOTE — PATIENT INSTRUCTIONS
[facility] office  Phone: [phone] [address]  Teresa Benson [address]    [facility]    [address] [facility] [place] [address]  Sophia Benson [address]    Phone : [phone]    [facility]    Address: [address] [place] [address]    Phone: [phone]

## 2022-10-22 DIAGNOSIS — G25.0 ESSENTIAL TREMOR: ICD-10-CM

## 2022-10-24 RX ORDER — TOPIRAMATE 100 MG/1
TABLET, FILM COATED ORAL
Qty: 30 TABLET | Refills: 0 | Status: SHIPPED | OUTPATIENT
Start: 2022-10-24 | End: 2022-11-16

## 2023-01-26 ENCOUNTER — TRANSCRIBE ORDER (OUTPATIENT)
Dept: SCHEDULING | Age: 73
End: 2023-01-26

## 2023-01-26 DIAGNOSIS — R07.9 CHEST PAIN: Primary | ICD-10-CM

## 2023-01-26 DIAGNOSIS — R63.4 WEIGHT LOSS: ICD-10-CM

## 2023-01-27 ENCOUNTER — HOSPITAL ENCOUNTER (OUTPATIENT)
Dept: CT IMAGING | Age: 73
Discharge: HOME | End: 2023-01-27
Attending: FAMILY MEDICINE
Payer: MEDICARE

## 2023-01-27 DIAGNOSIS — R63.4 WEIGHT LOSS: ICD-10-CM

## 2023-01-27 DIAGNOSIS — R07.9 CHEST PAIN: ICD-10-CM

## 2023-01-27 PROCEDURE — 82565 ASSAY OF CREATININE: CPT

## 2023-01-27 PROCEDURE — 74011000636 HC RX REV CODE- 636: Performed by: STUDENT IN AN ORGANIZED HEALTH CARE EDUCATION/TRAINING PROGRAM

## 2023-01-27 PROCEDURE — 74177 CT ABD & PELVIS W/CONTRAST: CPT

## 2023-01-27 RX ADMIN — IOPAMIDOL 100 ML: 755 INJECTION, SOLUTION INTRAVENOUS at 10:35

## 2023-01-28 LAB — CREAT BLD-MCNC: 0.7 MG/DL (ref 0.6–1.3)

## 2023-01-30 ENCOUNTER — TRANSCRIBE ORDER (OUTPATIENT)
Dept: SCHEDULING | Age: 73
End: 2023-01-30

## 2023-01-30 DIAGNOSIS — R92.1 BREAST CALCIFICATION, LEFT: Primary | ICD-10-CM

## 2023-01-30 DIAGNOSIS — R92.1 BREAST CALCIFICATION, RIGHT: ICD-10-CM

## 2023-01-30 DIAGNOSIS — R92.2 DENSE BREAST TISSUE: ICD-10-CM

## 2023-01-31 ENCOUNTER — TRANSCRIBE ORDER (OUTPATIENT)
Dept: SCHEDULING | Age: 73
End: 2023-01-31

## 2023-01-31 DIAGNOSIS — R92.1 BREAST CALCIFICATION, LEFT: Primary | ICD-10-CM

## 2023-02-01 ENCOUNTER — TRANSCRIBE ORDER (OUTPATIENT)
Dept: MAMMOGRAPHY | Age: 73
End: 2023-02-01

## 2023-02-01 DIAGNOSIS — Z12.31 VISIT FOR SCREENING MAMMOGRAM: Primary | ICD-10-CM

## 2023-02-06 ENCOUNTER — HOSPITAL ENCOUNTER (OUTPATIENT)
Dept: MAMMOGRAPHY | Age: 73
Discharge: HOME | End: 2023-02-06
Attending: FAMILY MEDICINE
Payer: MEDICARE

## 2023-02-06 ENCOUNTER — HOSPITAL ENCOUNTER (OUTPATIENT)
Dept: MAMMOGRAPHY | Age: 73
End: 2023-02-06
Attending: FAMILY MEDICINE
Payer: MEDICARE

## 2023-02-06 DIAGNOSIS — Z12.31 VISIT FOR SCREENING MAMMOGRAM: ICD-10-CM

## 2023-02-06 PROCEDURE — 77063 BREAST TOMOSYNTHESIS BI: CPT

## 2023-03-21 ENCOUNTER — HOSPITAL ENCOUNTER (INPATIENT)
Age: 73
LOS: 3 days | Discharge: NURSING, REHAB OR LONG-TERM CARE | End: 2023-03-24
Attending: STUDENT IN AN ORGANIZED HEALTH CARE EDUCATION/TRAINING PROGRAM | Admitting: INTERNAL MEDICINE
Payer: MEDICARE

## 2023-03-21 ENCOUNTER — APPOINTMENT (OUTPATIENT)
Dept: CT IMAGING | Age: 73
End: 2023-03-21
Attending: STUDENT IN AN ORGANIZED HEALTH CARE EDUCATION/TRAINING PROGRAM
Payer: MEDICARE

## 2023-03-21 ENCOUNTER — APPOINTMENT (OUTPATIENT)
Dept: CT IMAGING | Age: 73
End: 2023-03-21
Attending: INTERNAL MEDICINE
Payer: MEDICARE

## 2023-03-21 ENCOUNTER — APPOINTMENT (OUTPATIENT)
Dept: GENERAL RADIOLOGY | Age: 73
End: 2023-03-21
Attending: STUDENT IN AN ORGANIZED HEALTH CARE EDUCATION/TRAINING PROGRAM
Payer: MEDICARE

## 2023-03-21 ENCOUNTER — APPOINTMENT (OUTPATIENT)
Dept: GENERAL RADIOLOGY | Age: 73
End: 2023-03-21
Attending: INTERNAL MEDICINE
Payer: MEDICARE
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
## 2023-03-21 DIAGNOSIS — S32.592A FRACTURE OF MULTIPLE PUBIC RAMI, LEFT, CLOSED, INITIAL ENCOUNTER (HCC): ICD-10-CM

## 2023-03-21 PROBLEM — S32.599A PUBIC RAMUS FRACTURE (HCC): Status: ACTIVE | Noted: 2023-03-21

## 2023-03-21 LAB
ANION GAP SERPL CALC-SCNC: 9 MMOL/L (ref 5–15)
APPEARANCE UR: CLEAR
B PERT DNA SPEC QL NAA+PROBE: NOT DETECTED
BACTERIA URNS QL MICRO: NEGATIVE /HPF
BASOPHILS # BLD: 0 K/UL (ref 0–0.1)
BASOPHILS NFR BLD: 1 % (ref 0–1)
BILIRUB UR QL: NEGATIVE
BORDETELLA PARAPERTUSSIS PCR, BORPAR: NOT DETECTED
BUN SERPL-MCNC: 6 MG/DL (ref 6–20)
BUN/CREAT SERPL: 10 (ref 12–20)
C PNEUM DNA SPEC QL NAA+PROBE: NOT DETECTED
CALCIUM SERPL-MCNC: 7.8 MG/DL (ref 8.5–10.1)
CHLORIDE SERPL-SCNC: 101 MMOL/L (ref 97–108)
CO2 SERPL-SCNC: 26 MMOL/L (ref 21–32)
COLOR UR: ABNORMAL
CREAT SERPL-MCNC: 0.59 MG/DL (ref 0.55–1.02)
DIFFERENTIAL METHOD BLD: ABNORMAL
EOSINOPHIL # BLD: 0.1 K/UL (ref 0–0.4)
EOSINOPHIL NFR BLD: 1 % (ref 0–7)
EPITH CASTS URNS QL MICRO: ABNORMAL /LPF
ERYTHROCYTE [DISTWIDTH] IN BLOOD BY AUTOMATED COUNT: 13.9 % (ref 11.5–14.5)
FLUAV SUBTYP SPEC NAA+PROBE: NOT DETECTED
FLUBV RNA SPEC QL NAA+PROBE: NOT DETECTED
GLUCOSE SERPL-MCNC: 113 MG/DL (ref 65–100)
GLUCOSE UR STRIP.AUTO-MCNC: NEGATIVE MG/DL
HADV DNA SPEC QL NAA+PROBE: NOT DETECTED
HCOV 229E RNA SPEC QL NAA+PROBE: NOT DETECTED
HCOV HKU1 RNA SPEC QL NAA+PROBE: NOT DETECTED
HCOV NL63 RNA SPEC QL NAA+PROBE: NOT DETECTED
HCOV OC43 RNA SPEC QL NAA+PROBE: NOT DETECTED
HCT VFR BLD AUTO: 37.8 % (ref 35–47)
HGB BLD-MCNC: 13 G/DL (ref 11.5–16)
HGB UR QL STRIP: ABNORMAL
HMPV RNA SPEC QL NAA+PROBE: NOT DETECTED
HPIV1 RNA SPEC QL NAA+PROBE: NOT DETECTED
HPIV2 RNA SPEC QL NAA+PROBE: NOT DETECTED
HPIV3 RNA SPEC QL NAA+PROBE: NOT DETECTED
HPIV4 RNA SPEC QL NAA+PROBE: NOT DETECTED
IMM GRANULOCYTES # BLD AUTO: 0.1 K/UL (ref 0–0.04)
IMM GRANULOCYTES NFR BLD AUTO: 1 % (ref 0–0.5)
KETONES UR QL STRIP.AUTO: NEGATIVE MG/DL
LEUKOCYTE ESTERASE UR QL STRIP.AUTO: ABNORMAL
LYMPHOCYTES # BLD: 1.2 K/UL (ref 0.8–3.5)
LYMPHOCYTES NFR BLD: 15 % (ref 12–49)
M PNEUMO DNA SPEC QL NAA+PROBE: NOT DETECTED
MCH RBC QN AUTO: 34 PG (ref 26–34)
MCHC RBC AUTO-ENTMCNC: 34.4 G/DL (ref 30–36.5)
MCV RBC AUTO: 99 FL (ref 80–99)
MONOCYTES # BLD: 0.6 K/UL (ref 0–1)
MONOCYTES NFR BLD: 7 % (ref 5–13)
NEUTS SEG # BLD: 6.2 K/UL (ref 1.8–8)
NEUTS SEG NFR BLD: 76 % (ref 32–75)
NITRITE UR QL STRIP.AUTO: NEGATIVE
NRBC # BLD: 0 K/UL (ref 0–0.01)
NRBC BLD-RTO: 0 PER 100 WBC
PH UR STRIP: 7.5 (ref 5–8)
PLATELET # BLD AUTO: 167 K/UL (ref 150–400)
PMV BLD AUTO: 9.9 FL (ref 8.9–12.9)
POTASSIUM SERPL-SCNC: 3.4 MMOL/L (ref 3.5–5.1)
PROT UR STRIP-MCNC: NEGATIVE MG/DL
RBC # BLD AUTO: 3.82 M/UL (ref 3.8–5.2)
RBC #/AREA URNS HPF: ABNORMAL /HPF (ref 0–5)
RSV RNA SPEC QL NAA+PROBE: NOT DETECTED
RV+EV RNA SPEC QL NAA+PROBE: NOT DETECTED
SARS-COV-2 RNA RESP QL NAA+PROBE: NOT DETECTED
SODIUM SERPL-SCNC: 136 MMOL/L (ref 136–145)
SP GR UR REFRACTOMETRY: 1 (ref 1–1.03)
UA: UC IF INDICATED,UAUC: ABNORMAL
UROBILINOGEN UR QL STRIP.AUTO: 0.2 EU/DL (ref 0.2–1)
WBC # BLD AUTO: 8.2 K/UL (ref 3.6–11)
WBC URNS QL MICRO: ABNORMAL /HPF (ref 0–4)

## 2023-03-21 PROCEDURE — 73502 X-RAY EXAM HIP UNI 2-3 VIEWS: CPT

## 2023-03-21 PROCEDURE — 74011250637 HC RX REV CODE- 250/637: Performed by: INTERNAL MEDICINE

## 2023-03-21 PROCEDURE — 81001 URINALYSIS AUTO W/SCOPE: CPT

## 2023-03-21 PROCEDURE — 96374 THER/PROPH/DIAG INJ IV PUSH: CPT

## 2023-03-21 PROCEDURE — 65270000029 HC RM PRIVATE

## 2023-03-21 PROCEDURE — 74011000250 HC RX REV CODE- 250: Performed by: INTERNAL MEDICINE

## 2023-03-21 PROCEDURE — 80048 BASIC METABOLIC PNL TOTAL CA: CPT

## 2023-03-21 PROCEDURE — 74011250636 HC RX REV CODE- 250/636: Performed by: STUDENT IN AN ORGANIZED HEALTH CARE EDUCATION/TRAINING PROGRAM

## 2023-03-21 PROCEDURE — 71101 X-RAY EXAM UNILAT RIBS/CHEST: CPT

## 2023-03-21 PROCEDURE — 74011250636 HC RX REV CODE- 250/636: Performed by: INTERNAL MEDICINE

## 2023-03-21 PROCEDURE — 85025 COMPLETE CBC W/AUTO DIFF WBC: CPT

## 2023-03-21 PROCEDURE — 0202U NFCT DS 22 TRGT SARS-COV-2: CPT

## 2023-03-21 PROCEDURE — 73552 X-RAY EXAM OF FEMUR 2/>: CPT

## 2023-03-21 PROCEDURE — 71045 X-RAY EXAM CHEST 1 VIEW: CPT

## 2023-03-21 PROCEDURE — 72192 CT PELVIS W/O DYE: CPT

## 2023-03-21 PROCEDURE — 99285 EMERGENCY DEPT VISIT HI MDM: CPT

## 2023-03-21 PROCEDURE — 73200 CT UPPER EXTREMITY W/O DYE: CPT

## 2023-03-21 PROCEDURE — 36415 COLL VENOUS BLD VENIPUNCTURE: CPT

## 2023-03-21 PROCEDURE — 73030 X-RAY EXAM OF SHOULDER: CPT

## 2023-03-21 PROCEDURE — 70450 CT HEAD/BRAIN W/O DYE: CPT

## 2023-03-21 PROCEDURE — 72125 CT NECK SPINE W/O DYE: CPT

## 2023-03-21 PROCEDURE — 51702 INSERT TEMP BLADDER CATH: CPT

## 2023-03-21 PROCEDURE — 96375 TX/PRO/DX INJ NEW DRUG ADDON: CPT

## 2023-03-21 RX ORDER — ACETAMINOPHEN 325 MG/1
650 TABLET ORAL EVERY 6 HOURS
Status: DISCONTINUED | OUTPATIENT
Start: 2023-03-21 | End: 2023-03-24 | Stop reason: HOSPADM

## 2023-03-21 RX ORDER — NALTREXONE HYDROCHLORIDE 50 MG/1
50 TABLET, FILM COATED ORAL DAILY
COMMUNITY

## 2023-03-21 RX ORDER — PANTOPRAZOLE SODIUM 40 MG/1
40 TABLET, DELAYED RELEASE ORAL
Status: DISCONTINUED | OUTPATIENT
Start: 2023-03-22 | End: 2023-03-24 | Stop reason: HOSPADM

## 2023-03-21 RX ORDER — OXYCODONE HYDROCHLORIDE 5 MG/1
5 TABLET ORAL
Status: DISCONTINUED | OUTPATIENT
Start: 2023-03-21 | End: 2023-03-24 | Stop reason: HOSPADM

## 2023-03-21 RX ORDER — NALOXONE HYDROCHLORIDE 0.4 MG/ML
0.4 INJECTION, SOLUTION INTRAMUSCULAR; INTRAVENOUS; SUBCUTANEOUS AS NEEDED
Status: DISCONTINUED | OUTPATIENT
Start: 2023-03-21 | End: 2023-03-24 | Stop reason: HOSPADM

## 2023-03-21 RX ORDER — GLUCOSAMINE/CHONDR SU A SOD 750-600 MG
TABLET ORAL
COMMUNITY
End: 2023-03-24

## 2023-03-21 RX ORDER — AA/PROT/LYSINE/METHIO/VIT C/B6 50-12.5 MG
TABLET ORAL
COMMUNITY
End: 2023-03-24

## 2023-03-21 RX ORDER — ONDANSETRON 2 MG/ML
4 INJECTION INTRAMUSCULAR; INTRAVENOUS
Status: DISCONTINUED | OUTPATIENT
Start: 2023-03-21 | End: 2023-03-24 | Stop reason: HOSPADM

## 2023-03-21 RX ORDER — TOPIRAMATE 25 MG/1
100 TABLET ORAL
Status: DISCONTINUED | OUTPATIENT
Start: 2023-03-21 | End: 2023-03-24 | Stop reason: HOSPADM

## 2023-03-21 RX ORDER — CYCLOSPORINE 0.5 MG/ML
1 EMULSION OPHTHALMIC EVERY 12 HOURS
COMMUNITY
End: 2023-03-24

## 2023-03-21 RX ORDER — SODIUM CHLORIDE 0.9 % (FLUSH) 0.9 %
5-40 SYRINGE (ML) INJECTION AS NEEDED
Status: DISCONTINUED | OUTPATIENT
Start: 2023-03-21 | End: 2023-03-24 | Stop reason: HOSPADM

## 2023-03-21 RX ORDER — ROSUVASTATIN CALCIUM 10 MG/1
10 TABLET, COATED ORAL
Status: DISCONTINUED | OUTPATIENT
Start: 2023-03-21 | End: 2023-03-24 | Stop reason: HOSPADM

## 2023-03-21 RX ORDER — SODIUM CHLORIDE, SODIUM LACTATE, POTASSIUM CHLORIDE, CALCIUM CHLORIDE 600; 310; 30; 20 MG/100ML; MG/100ML; MG/100ML; MG/100ML
75 INJECTION, SOLUTION INTRAVENOUS CONTINUOUS
Status: DISCONTINUED | OUTPATIENT
Start: 2023-03-21 | End: 2023-03-24 | Stop reason: HOSPADM

## 2023-03-21 RX ORDER — NALTREXONE HYDROCHLORIDE 50 MG/1
50 TABLET, FILM COATED ORAL DAILY
Status: DISCONTINUED | OUTPATIENT
Start: 2023-03-22 | End: 2023-03-24 | Stop reason: HOSPADM

## 2023-03-21 RX ORDER — MORPHINE SULFATE 2 MG/ML
2 INJECTION, SOLUTION INTRAMUSCULAR; INTRAVENOUS
Status: DISCONTINUED | OUTPATIENT
Start: 2023-03-21 | End: 2023-03-24 | Stop reason: HOSPADM

## 2023-03-21 RX ORDER — HYDROMORPHONE HYDROCHLORIDE 1 MG/ML
0.5 INJECTION, SOLUTION INTRAMUSCULAR; INTRAVENOUS; SUBCUTANEOUS ONCE
Status: COMPLETED | OUTPATIENT
Start: 2023-03-21 | End: 2023-03-21

## 2023-03-21 RX ORDER — BUSPIRONE HYDROCHLORIDE 30 MG/1
30 TABLET ORAL 2 TIMES DAILY
COMMUNITY

## 2023-03-21 RX ORDER — ALUMINUM ZIRCONIUM OCTACHLOROHYDREX GLY 16 G/100G
GEL TOPICAL
COMMUNITY
End: 2023-03-24

## 2023-03-21 RX ORDER — DOCUSATE SODIUM 100 MG/1
100 CAPSULE, LIQUID FILLED ORAL 2 TIMES DAILY
Status: DISCONTINUED | OUTPATIENT
Start: 2023-03-21 | End: 2023-03-24 | Stop reason: HOSPADM

## 2023-03-21 RX ORDER — FENTANYL CITRATE 50 UG/ML
25 INJECTION, SOLUTION INTRAMUSCULAR; INTRAVENOUS
Status: COMPLETED | OUTPATIENT
Start: 2023-03-21 | End: 2023-03-21

## 2023-03-21 RX ORDER — BUSPIRONE HYDROCHLORIDE 15 MG/1
30 TABLET ORAL DAILY
Status: DISCONTINUED | OUTPATIENT
Start: 2023-03-22 | End: 2023-03-24 | Stop reason: HOSPADM

## 2023-03-21 RX ORDER — TRAZODONE HYDROCHLORIDE 100 MG/1
300 TABLET ORAL
Status: DISCONTINUED | OUTPATIENT
Start: 2023-03-21 | End: 2023-03-24 | Stop reason: HOSPADM

## 2023-03-21 RX ORDER — GLUCOSAMINE SULFATE 1500 MG
1000 POWDER IN PACKET (EA) ORAL DAILY
COMMUNITY
End: 2023-03-24

## 2023-03-21 RX ORDER — ACYCLOVIR 200 MG/1
200 CAPSULE ORAL DAILY
COMMUNITY

## 2023-03-21 RX ORDER — POTASSIUM CHLORIDE 750 MG/1
40 TABLET, FILM COATED, EXTENDED RELEASE ORAL
Status: COMPLETED | OUTPATIENT
Start: 2023-03-21 | End: 2023-03-21

## 2023-03-21 RX ORDER — ONDANSETRON 2 MG/ML
4 INJECTION INTRAMUSCULAR; INTRAVENOUS ONCE
Status: COMPLETED | OUTPATIENT
Start: 2023-03-21 | End: 2023-03-21

## 2023-03-21 RX ORDER — LATANOPROST 50 UG/ML
1 SOLUTION/ DROPS OPHTHALMIC
Status: DISCONTINUED | OUTPATIENT
Start: 2023-03-21 | End: 2023-03-24 | Stop reason: HOSPADM

## 2023-03-21 RX ORDER — GABAPENTIN 300 MG/1
300 CAPSULE ORAL 3 TIMES DAILY
Status: DISCONTINUED | OUTPATIENT
Start: 2023-03-21 | End: 2023-03-24 | Stop reason: HOSPADM

## 2023-03-21 RX ORDER — CYCLOSPORINE 0.5 MG/ML
1 EMULSION OPHTHALMIC EVERY 12 HOURS
Status: DISCONTINUED | OUTPATIENT
Start: 2023-03-21 | End: 2023-03-22

## 2023-03-21 RX ORDER — DOCUSATE SODIUM 100 MG/1
100 CAPSULE, LIQUID FILLED ORAL AS NEEDED
COMMUNITY

## 2023-03-21 RX ORDER — ACYCLOVIR 200 MG/1
200 CAPSULE ORAL
Status: DISCONTINUED | OUTPATIENT
Start: 2023-03-21 | End: 2023-03-22

## 2023-03-21 RX ORDER — SODIUM CHLORIDE 0.9 % (FLUSH) 0.9 %
5-40 SYRINGE (ML) INJECTION EVERY 8 HOURS
Status: DISCONTINUED | OUTPATIENT
Start: 2023-03-21 | End: 2023-03-24 | Stop reason: HOSPADM

## 2023-03-21 RX ADMIN — SODIUM CHLORIDE, PRESERVATIVE FREE 10 ML: 5 INJECTION INTRAVENOUS at 22:05

## 2023-03-21 RX ADMIN — HYDROMORPHONE HYDROCHLORIDE 0.5 MG: 1 INJECTION, SOLUTION INTRAMUSCULAR; INTRAVENOUS; SUBCUTANEOUS at 14:55

## 2023-03-21 RX ADMIN — POTASSIUM CHLORIDE 40 MEQ: 750 TABLET, EXTENDED RELEASE ORAL at 17:32

## 2023-03-21 RX ADMIN — ACYCLOVIR 200 MG: 200 CAPSULE ORAL at 22:04

## 2023-03-21 RX ADMIN — SODIUM CHLORIDE, PRESERVATIVE FREE 10 ML: 5 INJECTION INTRAVENOUS at 17:33

## 2023-03-21 RX ADMIN — SODIUM CHLORIDE, POTASSIUM CHLORIDE, SODIUM LACTATE AND CALCIUM CHLORIDE 50 ML/HR: 600; 310; 30; 20 INJECTION, SOLUTION INTRAVENOUS at 17:34

## 2023-03-21 RX ADMIN — ROSUVASTATIN CALCIUM 10 MG: 10 TABLET, FILM COATED ORAL at 21:51

## 2023-03-21 RX ADMIN — ACETAMINOPHEN 650 MG: 325 TABLET ORAL at 17:33

## 2023-03-21 RX ADMIN — DOCUSATE SODIUM 100 MG: 100 CAPSULE, LIQUID FILLED ORAL at 19:39

## 2023-03-21 RX ADMIN — TRAZODONE HYDROCHLORIDE 300 MG: 100 TABLET ORAL at 22:07

## 2023-03-21 RX ADMIN — FENTANYL CITRATE 25 MCG: 50 INJECTION, SOLUTION INTRAMUSCULAR; INTRAVENOUS at 11:30

## 2023-03-21 RX ADMIN — LATANOPROST 1 DROP: 50 SOLUTION OPHTHALMIC at 22:06

## 2023-03-21 RX ADMIN — ACYCLOVIR 200 MG: 200 CAPSULE ORAL at 19:39

## 2023-03-21 RX ADMIN — TOPIRAMATE 100 MG: 100 TABLET, FILM COATED ORAL at 22:05

## 2023-03-21 RX ADMIN — ONDANSETRON 4 MG: 2 INJECTION INTRAMUSCULAR; INTRAVENOUS at 11:29

## 2023-03-21 RX ADMIN — GABAPENTIN 300 MG: 300 CAPSULE ORAL at 21:51

## 2023-03-21 NOTE — H&P
[facility] [address]  [address] [facility] [place], [address] [place] [address]  [phone]    Admission History and Physical      NAME:  Judah Andrew   :   [birth year]   MRN:  [id number]     PCP:  Archie Davies MD     Date/Time of service:  3/21/2023  5:24 PM        Subjective:     CHIEF COMPLAINT: fall    HISTORY OF PRESENT ILLNESS:     Ms. Monae Schultz is a 67 y.o.   female with a past medical history of chronic anticoagulation,  GERD, hyperlipidemia, anxiety and depression who is admitted with a fall. Ms. Monae Schultz presented to the Emergency Department today complaining of left-sided hip, shoulder and rib pain. She denies any chest pain, lightheadedness prior to her fall. She states that she is having trouble urinating and often has trouble urinating when she is flat on her back. She denies any recent sickness fever chills.  at bedside. Allergies   Allergen Reactions    Ciprofibrate Myalgia and Other (comments)     Sore and aching all over    Diclofenac Nausea and Vomiting       Prior to Admission medications    Medication Sig Start Date End Date Taking? Authorizing Provider   busPIRone (BUSPAR) 30 mg tablet Take 30 mg by mouth daily. Yes Romain, MD Lizandro   apixaban (Eliquis) 5 mg tablet Take 5 mg by mouth two (2) times a day. Yes Other, MD Lizandro   cycloSPORINE (Restasis) 0.05 % dpet Administer 1 Drop to both eyes every twelve (12) hours. Yes Romain, MD Lizandro   acyclovir (ZOVIRAX) 200 mg capsule Take 200 mg by mouth every four (4) hours (while awake). Yes Romain, MD Lizandro   naltrexone (DEPADE) 50 mg tablet Take 50 mg by mouth daily. Yes Romain, MD Lizandro   docusate sodium (COLACE) 100 mg capsule Take 100 mg by mouth two (2) times a day. Yes Romain, MD Lizandro   cholecalciferol (Vitamin D3) 25 mcg (1,000 unit) cap Take 1,000 Units by mouth daily. Yes Romain, MD Lizandro   Bifidobacterium Infantis (Align) 4 mg cap Take  by mouth.    Yes Romain, MD Lizandro   coenzyme q10 (Co Q-10) 10 mg cap Take  by mouth. Yes Other, MD Lizandro   ginkgo biloba leaf extract 60 mg tab Take  by mouth. Yes Other, MD Lizandro   topiramate (TOPAMAX) 100 mg tablet Take 1 tablet by mouth nightly 11/16/22  Yes Anusha Davenport MD   rosuvastatin (CRESTOR) 10 mg tablet Take 10 mg by mouth nightly. Yes Provider, Historical   gabapentin (NEURONTIN) 300 mg capsule TAKE 1 CAPSULE BY MOUTH THREE TIMES DAILY 4/12/21  Yes Provider, Historical   omeprazole (PRILOSEC) 40 mg capsule Take 40 mg by mouth daily. Yes Provider, Historical   traZODone (DESYREL) 100 mg tablet Take 300 mg by mouth nightly. Indications: insomnia   Yes Provider, Historical   latanoprost (XALATAN) 0.005 % ophthalmic solution Administer 1 Drop to both eyes nightly. Yes Provider, Historical   lisinopril (PRINIVIL, ZESTRIL) 10 mg tablet Take 5 mg by mouth daily. Patient not taking: Reported on 3/21/2023    Provider, Historical   peg 400-propylene glycol (SYSTANE, PROPYLENE GLYCOL,) 0.4-0.3 % drop Administer 1 Drop to both eyes as needed. Provider, Historical   denosumab (PROLIA) 60 mg/mL injection 60 mg by SubCUTAneous route every 6 months.  Indications: POST-MENOPAUSAL OSTEOPOROSIS    Provider, Historical       Past Medical History:   Diagnosis Date    Adverse effect of anesthesia     per PCP patient had bladder retention after anesthesia with skin cancer surgery, under treatment with urologist. Done at [address] [place] [address]     GERD (gastroesophageal reflux disease)     High cholesterol     Ill-defined condition 2008    fell hematoma of head watched in hospital 3 days resolved    Ill-defined condition 2000    no central vision in left eye    Ill-defined condition     osteporosis    Ill-defined condition     warts on hands    Psychiatric disorder     depression and anxiety        Past Surgical History:   Procedure Laterality Date    HX APPENDECTOMY      HX BREAST BIOPSY Bilateral     Age 21 - Benign    HX DILATION AND CURETTAGE      HX OTHER SURGICAL      multiple skin cancers     HX TONSILLECTOMY         Social History     Tobacco Use    Smoking status: Never    Smokeless tobacco: Never   Substance Use Topics    Alcohol use: Not Currently     Alcohol/week: 0.0 standard drinks     Comment: none        History reviewed. No pertinent family history. Review of Systems:  Per HPI         Objective:      VITALS:    Vital signs reviewed; most recent are:    Visit Vitals  /67   Pulse (!) 103   Temp 98 °F (36.7 °C)   Resp 16   Ht 5' 6\" (1.676 m)   Wt 51.7 kg (114 lb)   SpO2 95%   BMI 18.40 kg/m²     SpO2 Readings from Last 6 Encounters:   03/21/23 95%   02/22/22 98%   05/04/21 97%   11/04/17 92%   06/09/12 98%    O2 Flow Rate (L/min): 2 l/min   No intake or output data in the 24 hours ending 03/21/23 1724     Exam:     Physical Exam:    Gen: Elderly, in no acute distress  HEENT:  Pink conjunctivae, PERRL, hearing intact to voice, moist mucous membranes  Neck:  Supple, no tenderness to palpation   Resp:  No accessory muscle use, clear breath sounds without wheezes rales or rhonchi  Card:  RRR, No murmurs, normal S1, S2, no peripheral edema  Abd:  Soft, non-tender, non-distended, normoactive bowel sounds are present  Musc: Limited range of motion of left side  Skin:  No rashes or ulcers, skin turgor is good  Neuro:  Cranial nerves 3-12 are grossly intact, follows commands appropriately  Psych:  Oriented to person, place, and time, Alert with good insight      Labs:    Recent Labs     03/21/23  1342   WBC 8.2   HGB 13.0   HCT 37.8        Recent Labs     03/21/23  1342      K 3.4*      CO2 26   *   BUN 6   CREA 0.59   CA 7.8*     No results found for: GLUCPOC  No results for input(s): PH, PCO2, PO2, HCO3, FIO2 in the last 72 hours. No results for input(s): INR, INREXT in the last 72 hours.     Radiology and EKG reviewed:   CT pelvic showing fractures of left sacral ala, left superior and inferior pubic rami     Old Records reviewed in [address] [facility] Assessment/Plan:       Fall POA: Denies any cardiac or neurological prodrome. PT OT once cleared by Ortho. Left sacral ala and left superior and inferior pubic rami POA: Due to fall. Scheduled Tylenol. As needed oxycodone. As needed IV morphine. Consult orthopedics. Concern for left rotator cuff injury POA: Obtain CT shoulder. Monitor. Respiratory insufficiency POA: Requiring 2 L nasal cannula on admission. Denies any history of pulmonary illnesses. Check chest x-ray. X-ray ribs show no fractures. Check respiratory viral panel. Incentive spirometer. Hx of PE: hold elqiuis pending ortho eval     Hyperlipidemia: Continue statin. Anxiety/depression POA: Continue home Topamax, trazodone and BuSpar.     Glaucoma: continue eyedrops    Risk of deterioration: high      Total time spent with patient: 36 Minutes **I personally saw and examined the patient during this time period**                 Care Plan discussed with: Patient, Family, and Nursing Staff    Discussed:  Care Plan    Prophylaxis:  SCD's, hold home Eliquis pending Ortho eval    Probable Disposition:  SNF/LTC           ___________________________________________________    Attending Physician: Fermin Lundberg DO

## 2023-03-21 NOTE — ED TRIAGE NOTES
Patient presents to treatment area via EMS. Patient states she was going to the bathroom last night using rollator, and fell, hitting the left side of her body on the tile floor. Patient denies striking head or LOC. Complains of left shoulder, rib, and hip pain. Unable to lift left hip.

## 2023-03-21 NOTE — ED NOTES
Dr. Adela Radford rounded on patient, informed her of patient concern to want to urinate but feels like she cannot void, offered [place] does not want to use, patient would prefer to be straight cath, will bladder scan first.

## 2023-03-21 NOTE — ED PROVIDER NOTES
HPI     Date of Service:  3/21/2023    Patient:  Molly Beach    Chief Complaint:  Lina Stallings       HPI:  Molly Beach is a 67 y.o.  female with a past medical history of listed below who presents for evaluation of a fall. Patient reports last night while using her rollator to go to the bathroom, she tripped on the rollator and fell onto her left side.  was able to assist her back into bed. Denies head strike or LOC. + blood thinner use. She is complaining of left shoulder, left-sided rib and left hip pain. She has chronic back pain which is at baseline. No neck pain. Denies chest or abdominal pain. Denies recent illness. Past Medical History:   Diagnosis Date    Adverse effect of anesthesia     per PCP patient had bladder retention after anesthesia with skin cancer surgery, under treatment with urologist. Etelvina Taylor at [address]     GERD (gastroesophageal reflux disease)     High cholesterol     Ill-defined condition 2008    fell hematoma of head watched in hospital 3 days resolved    Ill-defined condition 2000    no central vision in left eye    Ill-defined condition     osteporosis    Ill-defined condition     warts on hands    Psychiatric disorder     depression and anxiety       Past Surgical History:   Procedure Laterality Date    HX APPENDECTOMY      HX BREAST BIOPSY Bilateral     Age 21 - Benign    HX DILATION AND CURETTAGE      HX OTHER SURGICAL      multiple skin cancers     HX TONSILLECTOMY           History reviewed. No pertinent family history.     Social History     Socioeconomic History    Marital status:      Spouse name: Not on file    Number of children: Not on file    Years of education: Not on file    Highest education level: Not on file   Occupational History    Not on file   Tobacco Use    Smoking status: Never    Smokeless tobacco: Never   Substance and Sexual Activity    Alcohol use: Not Currently     Alcohol/week: 0.0 standard drinks     Comment: none    Drug use: Not on file    Sexual activity: Not on file   Other Topics Concern    Not on file   Social History Narrative    Not on file     Social Determinants of Health     Financial Resource Strain: Not on file   Food Insecurity: Not on file   Transportation Needs: Not on file   Physical Activity: Not on file   Stress: Not on file   Social Connections: Not on file   Intimate Partner Violence: Not on file   Housing Stability: Not on file         ALLERGIES: Ciprofibrate and Diclofenac    Review of Systems   Constitutional:  Negative for chills and fever. HENT:  Negative for congestion and rhinorrhea. Eyes:  Negative for discharge and redness. Respiratory:  Negative for cough and shortness of breath. Cardiovascular:  Negative for chest pain. Gastrointestinal:  Negative for abdominal pain, diarrhea, nausea and vomiting. Musculoskeletal:  Positive for arthralgias and back pain. Neurological:  Negative for speech difficulty and headaches. Psychiatric/Behavioral:  Negative for agitation and confusion. Vitals:    03/21/23 1109   BP: 109/78   Pulse: (!) 107   Resp: 20   Temp: 99.2 °F (37.3 °C)   SpO2: 96%   Weight: 51.7 kg (114 lb)            Physical Exam  Vitals and nursing note reviewed. Constitutional:       General: She is in acute distress. Appearance: She is not ill-appearing or toxic-appearing. HENT:      Head: Normocephalic. Eyes:      General: No scleral icterus. Right eye: No discharge. Left eye: No discharge. Conjunctiva/sclera: Conjunctivae normal.   Cardiovascular:      Rate and Rhythm: Normal rate. Pulses: Normal pulses. Pulmonary:      Effort: Pulmonary effort is normal. No respiratory distress. Chest:      Chest wall: Tenderness present. No crepitus. Abdominal:      General: Abdomen is flat. Palpations: Abdomen is soft. Tenderness: There is no abdominal tenderness. Musculoskeletal:      Left shoulder: Tenderness present. No deformity. Normal range of motion. Arms:       Left hip: Tenderness present. Decreased range of motion. Left upper leg: Tenderness present. No edema or deformity. Left foot: Normal capillary refill. Normal pulse. Skin:     General: Skin is warm and dry. Capillary Refill: Capillary refill takes less than 2 seconds. Neurological:      General: No focal deficit present. Mental Status: She is alert and oriented to person, place, and time. Psychiatric:         Mood and Affect: Mood normal.         Behavior: Behavior normal.        Medical Decision Making      DECISION MAKING:  Lesvia Escobar is a 67 y.o. female who comes in as above. On arrival patient is afebrile, vital signs notable for mild tachycardia with a heart rate of 107 bpm.  Vital signs otherwise stable. On my examination she is uncomfortable appearing and in mild distress secondary to pain. There is tenderness palpation over the left lateral mid rib region but no appreciated crepitus or ecchymosis. The left shoulder is tender but she is able to move the arm, there is overlying ecchymosis and no deformity. There is tenderness over the left hip with limited range of motion secondary to pain. No appreciated shortening or rotation of the lower extremity. Neurovascular intact distally. Will obtain x-ray imaging of the shoulder, ribs and hip and pelvis to rule out fracture or dislocation. Patient denies head strike or LOC, normal mentation therefore do not feel imaging of the head is indicated at this time. X-rays of the left ribs and shoulder are negative for fractures or other acute bony abnormalities. X-ray of the left hip and pelvis does show nondisplaced left superior and inferior pubic rami fractures. Patient's case was reviewed with on-call orthopedics at [facility] Flavia Agarwal PA-C; recommends CT of the pelvis for further assessment and will consult at [facility].   Patient's case was subsequently discussed with the hospitalist, Dr. Derrick Sim, at [address] [place] [address] for admission. Perfect Serve Consult for Admission  1:29 PM    ED Room Number: WER07/07  Patient Name and age:  Valery Sender 67 y.o.  female  Working Diagnosis: Fall, initial encounter  (primary encounter diagnosis)  Fracture of multiple pubic rami, left, closed, initial encounter ([address]    COVID-19 Suspicion:  no  Sepsis present:  no  Reassessment needed: no  Code Status:  Full Code  Readmission: no  Isolation Requirements:  no  Recommended Level of Care:  med/surg  Department: [facility] ED - [phone]  Admitting Provider: Dr. Derrick Sim    Other: 71-year-old female with past medical history of hypertension, hyperlipidemia, previous VTE on Eliquis presented for ground-level fall and left hip pain. Also complained of left hip and rib pain. X-rays notable for a left superior and inferior pubic rami fracture. Orthopedics consulted, recommending CT. Admitting for pain control and inability to ambulate. Ortho will see patient in the hospital.    Amount and/or Complexity of Data Reviewed  Labs: ordered. Radiology: ordered. ECG/medicine tests: ordered. Risk  OTC drugs. Prescription drug management. Decision regarding hospitalization. Procedures      LABS:    Recent Results (from the past 24 hour(s))   CBC WITH AUTOMATED DIFF    Collection Time: 03/21/23  1:42 PM   Result Value Ref Range    WBC 8.2 3.6 - 11.0 K/uL    RBC 3.82 3.80 - 5.20 M/uL    HGB 13.0 11.5 - 16.0 g/dL    HCT 37.8 35.0 - 47.0 %    MCV 99.0 80.0 - 99.0 FL    MCH 34.0 26.0 - 34.0 PG    MCHC 34.4 30.0 - 36.5 g/dL    RDW 13.9 11.5 - 14.5 %    PLATELET 696 442 - 582 K/uL    MPV 9.9 8.9 - 12.9 FL    NRBC 0.0 0.0  WBC    ABSOLUTE NRBC 0.00 0.00 - 0.01 K/uL    NEUTROPHILS 76 (H) 32 - 75 %    LYMPHOCYTES 15 12 - 49 %    MONOCYTES 7 5 - 13 %    EOSINOPHILS 1 0 - 7 %    BASOPHILS 1 0 - 1 %    IMMATURE GRANULOCYTES 1 (H) 0 - 0.5 %    ABS. NEUTROPHILS 6.2 1.8 - 8.0 K/UL    ABS. LYMPHOCYTES 1.2 0.8 - 3.5 K/UL    ABS. MONOCYTES 0.6 0.0 - 1.0 K/UL    ABS. EOSINOPHILS 0.1 0.0 - 0.4 K/UL    ABS. BASOPHILS 0.0 0.0 - 0.1 K/UL    ABS. IMM. GRANS. 0.1 (H) 0.00 - 0.04 K/UL    DF AUTOMATED     METABOLIC PANEL, BASIC    Collection Time: 03/21/23  1:42 PM   Result Value Ref Range    Sodium 136 136 - 145 mmol/L    Potassium 3.4 (L) 3.5 - 5.1 mmol/L    Chloride 101 97 - 108 mmol/L    CO2 26 21 - 32 mmol/L    Anion gap 9 5 - 15 mmol/L    Glucose 113 (H) 65 - 100 mg/dL    BUN 6 6 - 20 MG/DL    Creatinine 0.59 0.55 - 1.02 MG/DL    BUN/Creatinine ratio 10 (L) 12 - 20      eGFR >60 >60 ml/min/1.73m2    Calcium 7.8 (L) 8.5 - 10.1 MG/DL       IMAGING:  CT PELV WO CONT   Final Result   1. Insufficiency fractures of the left sacral ala and left superior and inferior   pubic rami         XR RIBS LT W PA CXR MIN 3 V   Final Result   No acute rib fracture or other acute abnormality in the chest.          XR SHOULDER LT AP/LAT MIN 2 V   Final Result   No acute abnormality. Mild degenerative changes. Elevation of the   humeral head suggesting rotator cuff injury. XR HIP LT W OR WO PELV 2-3 VWS   Final Result   Acute nondisplaced fractures of the left superior and inferior pubic rami. XR FEMUR LT 2 V   Final Result   Acute nondisplaced fractures of the left superior and inferior pubic rami. IMPRESSION:  1. Fall, initial encounter    2.  Fracture of multiple pubic rami, left, closed, initial encounter [facility])        DISPOSITION:  Admitted      Delphine Rea DO

## 2023-03-21 NOTE — ED NOTES
Report received from EMS crew, patient transferred from [facility]. Placed patient on cardiac monitor. Pain now 3/10 post pain medication prior to transfer.

## 2023-03-21 NOTE — ED NOTES
TRANSFER - OUT REPORT:    Verbal report given to Juan Delacruz RN(name) on Janelle Barthel  being transferred to [facility] ED(unit) for routine progression of care       Report consisted of patients Situation, Background, Assessment and   Recommendations(SBAR). Information from the following report(s) ED Summary was reviewed with the receiving nurse. Lines:   Peripheral IV 03/21/23 Anterior;Left;Proximal Forearm (Active)        Opportunity for questions and clarification was provided.

## 2023-03-22 LAB
ANION GAP SERPL CALC-SCNC: 5 MMOL/L (ref 5–15)
BASOPHILS # BLD: 0 K/UL (ref 0–0.1)
BASOPHILS NFR BLD: 0 % (ref 0–1)
BUN SERPL-MCNC: 10 MG/DL (ref 6–20)
BUN/CREAT SERPL: 18 (ref 12–20)
CALCIUM SERPL-MCNC: 7.9 MG/DL (ref 8.5–10.1)
CHLORIDE SERPL-SCNC: 105 MMOL/L (ref 97–108)
CO2 SERPL-SCNC: 25 MMOL/L (ref 21–32)
CREAT SERPL-MCNC: 0.57 MG/DL (ref 0.55–1.02)
DIFFERENTIAL METHOD BLD: ABNORMAL
EOSINOPHIL # BLD: 0.2 K/UL (ref 0–0.4)
EOSINOPHIL NFR BLD: 3 % (ref 0–7)
ERYTHROCYTE [DISTWIDTH] IN BLOOD BY AUTOMATED COUNT: 13.9 % (ref 11.5–14.5)
GLUCOSE SERPL-MCNC: 107 MG/DL (ref 65–100)
HCT VFR BLD AUTO: 38.1 % (ref 35–47)
HGB BLD-MCNC: 12.5 G/DL (ref 11.5–16)
IMM GRANULOCYTES # BLD AUTO: 0 K/UL (ref 0–0.04)
IMM GRANULOCYTES NFR BLD AUTO: 1 % (ref 0–0.5)
LYMPHOCYTES # BLD: 1.3 K/UL (ref 0.8–3.5)
LYMPHOCYTES NFR BLD: 19 % (ref 12–49)
MAGNESIUM SERPL-MCNC: 1.9 MG/DL (ref 1.6–2.4)
MCH RBC QN AUTO: 33.9 PG (ref 26–34)
MCHC RBC AUTO-ENTMCNC: 32.8 G/DL (ref 30–36.5)
MCV RBC AUTO: 103.3 FL (ref 80–99)
MONOCYTES # BLD: 0.6 K/UL (ref 0–1)
MONOCYTES NFR BLD: 9 % (ref 5–13)
NEUTS SEG # BLD: 4.5 K/UL (ref 1.8–8)
NEUTS SEG NFR BLD: 68 % (ref 32–75)
NRBC # BLD: 0 K/UL (ref 0–0.01)
NRBC BLD-RTO: 0 PER 100 WBC
PLATELET # BLD AUTO: 140 K/UL (ref 150–400)
PMV BLD AUTO: 10 FL (ref 8.9–12.9)
POTASSIUM SERPL-SCNC: 3.8 MMOL/L (ref 3.5–5.1)
RBC # BLD AUTO: 3.69 M/UL (ref 3.8–5.2)
SODIUM SERPL-SCNC: 135 MMOL/L (ref 136–145)
WBC # BLD AUTO: 6.7 K/UL (ref 3.6–11)

## 2023-03-22 PROCEDURE — 80048 BASIC METABOLIC PNL TOTAL CA: CPT

## 2023-03-22 PROCEDURE — 83735 ASSAY OF MAGNESIUM: CPT

## 2023-03-22 PROCEDURE — 65270000029 HC RM PRIVATE

## 2023-03-22 PROCEDURE — 74011000250 HC RX REV CODE- 250: Performed by: INTERNAL MEDICINE

## 2023-03-22 PROCEDURE — 85025 COMPLETE CBC W/AUTO DIFF WBC: CPT

## 2023-03-22 PROCEDURE — 74011250637 HC RX REV CODE- 250/637: Performed by: INTERNAL MEDICINE

## 2023-03-22 PROCEDURE — 74011250636 HC RX REV CODE- 250/636: Performed by: INTERNAL MEDICINE

## 2023-03-22 PROCEDURE — 36415 COLL VENOUS BLD VENIPUNCTURE: CPT

## 2023-03-22 RX ORDER — ACYCLOVIR 200 MG/1
200 CAPSULE ORAL DAILY
Status: DISCONTINUED | OUTPATIENT
Start: 2023-03-23 | End: 2023-03-24 | Stop reason: HOSPADM

## 2023-03-22 RX ADMIN — TRAZODONE HYDROCHLORIDE 300 MG: 100 TABLET ORAL at 21:07

## 2023-03-22 RX ADMIN — TOPIRAMATE 100 MG: 100 TABLET, FILM COATED ORAL at 21:07

## 2023-03-22 RX ADMIN — DOCUSATE SODIUM 100 MG: 100 CAPSULE, LIQUID FILLED ORAL at 08:06

## 2023-03-22 RX ADMIN — GABAPENTIN 300 MG: 300 CAPSULE ORAL at 17:06

## 2023-03-22 RX ADMIN — ACETAMINOPHEN 650 MG: 325 TABLET ORAL at 05:24

## 2023-03-22 RX ADMIN — SODIUM CHLORIDE, PRESERVATIVE FREE 10 ML: 5 INJECTION INTRAVENOUS at 14:31

## 2023-03-22 RX ADMIN — OXYCODONE HYDROCHLORIDE 5 MG: 5 TABLET ORAL at 01:09

## 2023-03-22 RX ADMIN — APIXABAN 5 MG: 5 TABLET, FILM COATED ORAL at 17:05

## 2023-03-22 RX ADMIN — SODIUM CHLORIDE, PRESERVATIVE FREE 10 ML: 5 INJECTION INTRAVENOUS at 05:25

## 2023-03-22 RX ADMIN — PANTOPRAZOLE SODIUM 40 MG: 40 TABLET, DELAYED RELEASE ORAL at 05:35

## 2023-03-22 RX ADMIN — ACETAMINOPHEN 650 MG: 325 TABLET ORAL at 17:06

## 2023-03-22 RX ADMIN — LATANOPROST 1 DROP: 50 SOLUTION OPHTHALMIC at 21:09

## 2023-03-22 RX ADMIN — ACYCLOVIR 200 MG: 200 CAPSULE ORAL at 05:24

## 2023-03-22 RX ADMIN — OXYCODONE HYDROCHLORIDE 5 MG: 5 TABLET ORAL at 18:47

## 2023-03-22 RX ADMIN — GABAPENTIN 300 MG: 300 CAPSULE ORAL at 08:07

## 2023-03-22 RX ADMIN — SODIUM CHLORIDE, PRESERVATIVE FREE 10 ML: 5 INJECTION INTRAVENOUS at 21:07

## 2023-03-22 RX ADMIN — DOCUSATE SODIUM 100 MG: 100 CAPSULE, LIQUID FILLED ORAL at 17:06

## 2023-03-22 RX ADMIN — GABAPENTIN 300 MG: 300 CAPSULE ORAL at 21:07

## 2023-03-22 RX ADMIN — SODIUM CHLORIDE, POTASSIUM CHLORIDE, SODIUM LACTATE AND CALCIUM CHLORIDE 50 ML/HR: 600; 310; 30; 20 INJECTION, SOLUTION INTRAVENOUS at 01:11

## 2023-03-22 RX ADMIN — ROSUVASTATIN CALCIUM 10 MG: 10 TABLET, FILM COATED ORAL at 21:07

## 2023-03-22 RX ADMIN — ACYCLOVIR 200 MG: 200 CAPSULE ORAL at 10:34

## 2023-03-22 RX ADMIN — ACETAMINOPHEN 650 MG: 325 TABLET ORAL at 12:44

## 2023-03-22 RX ADMIN — BUSPIRONE HYDROCHLORIDE 30 MG: 15 TABLET ORAL at 08:23

## 2023-03-22 RX ADMIN — SODIUM CHLORIDE, POTASSIUM CHLORIDE, SODIUM LACTATE AND CALCIUM CHLORIDE 50 ML/HR: 600; 310; 30; 20 INJECTION, SOLUTION INTRAVENOUS at 21:06

## 2023-03-22 NOTE — PROGRESS NOTES
Problem: Falls - Risk of  Goal: *Absence of Falls  Description: Document Minna Zuluaga Fall Risk and appropriate interventions in the flowsheet. Outcome: Progressing Towards Goal  Note: Fall Risk Interventions:                                Problem: Patient Education: Go to Patient Education Activity  Goal: Patient/Family Education  Outcome: Progressing Towards Goal     Problem: Pressure Injury - Risk of  Goal: *Prevention of pressure injury  Description: Document Cortez Scale and appropriate interventions in the flowsheet. Outcome: Progressing Towards Goal  Note: Pressure Injury Interventions:             Activity Interventions: Increase time out of bed, Assess need for specialty bed, PT/OT evaluation    Mobility Interventions: Assess need for specialty bed, HOB 30 degrees or less, PT/OT evaluation    Nutrition Interventions: Document food/fluid/supplement intake    Friction and Shear Interventions: Lift sheet, Minimize layers                Problem: Patient Education: Go to Patient Education Activity  Goal: Patient/Family Education  Outcome: Progressing Towards Goal     Problem: Pain  Goal: *Control of Pain  Outcome: Progressing Towards Goal  Goal: *PALLIATIVE CARE:  Alleviation of Pain  Outcome: Progressing Towards Goal     Problem: Patient Education: Go to Patient Education Activity  Goal: Patient/Family Education  Outcome: Progressing Towards Goal

## 2023-03-22 NOTE — PROGRESS NOTES
Comprehensive Nutrition Assessment    Type and Reason for Visit: Initial, Positive nutrition screen    Nutrition Recommendations/Plan:   Continue regular diet order  Provide Ensure High Protein once daily (160 kcal, 19 g carbs, 16 g protein)       Malnutrition Assessment:  Malnutrition Status:  Mild malnutrition (03/22/23 1304)    Context:  Chronic illness     Findings of the 6 clinical characteristics of malnutrition:   Energy Intake:  No significant decrease in energy intake  Weight Loss:  Greater than 20% over 1 year     Body Fat Loss:  Mild body fat loss, Fat overlying ribs   Muscle Mass Loss:  Mild muscle mass loss,    Fluid Accumulation:  No significant fluid accumulation,     Strength:  Not performed     Nutrition Assessment:     Pt is a 67year old female admitted with Pub[facility] ([facility] [S32.599A]. She  has a past medical history of Adverse effect of anesthesia, Anxiety, GERD (gastroesophageal reflux disease), High cholesterol, Ill-defined condition (2008), Ill-defined condition (2000), Ill-defined condition, Ill-defined condition, and Psychiatric disorder. BPA for wt loss 14 - 23#. Lack of recent weight history. RD obtained measured bedscale weight of 128.2#. This would be a 38# (22.8%) loss x ~1 year, if weight history accurate. Clinically significant for timeframe. Patient very lethargic today, does not answer many questions before falling back asleep. NKFA. No chewing/swallowing problems. No nausea/vomiting/diarrhea. RD to order ONS trial to improve kcal/protein intake. No PO intake documented yet but patient reports appetite is 'fair'. Wt Readings from Last 10 Encounters:   03/22/23 58.2 kg (128 lb 3.2 oz)   02/22/22 75.3 kg (166 lb)   05/04/21 75.3 kg (166 lb)   01/29/21 75.3 kg (166 lb)   11/03/17 77.1 kg (170 lb)       Documented Meal intake:  No data found. Documentation of supplement intake:  No data found.       Nutrition Related Findings:      Wound Type: None  Last Bowel Movement Date: 03/21/23  Abdominal Assessment: Intact, Soft  Appetite: Good  Bowel Sounds: Active   Edema:No data recorded    Nutr. Labs:  Lab Results   Component Value Date/Time    GFR est AA >60 11/03/2017 10:05 PM    GFR est non-AA >60 11/03/2017 10:05 PM    Creatinine (POC) 0.70 01/27/2023 10:26 AM    Creatinine 0.57 03/22/2023 03:27 AM    BUN 10 03/22/2023 03:27 AM    Sodium 135 (L) 03/22/2023 03:27 AM    Potassium 3.8 03/22/2023 03:27 AM    Chloride 105 03/22/2023 03:27 AM    CO2 25 03/22/2023 03:27 AM       Lab Results   Component Value Date/Time    Glucose 107 (H) 03/22/2023 03:27 AM       No results found for: HBA1C, XHN5QDAR, WLN4HDSD, IDE9DPHM    Magnesium   Date Value Ref Range Status   03/22/2023 1.9 1.6 - 2.4 mg/dL Final       Lab Results   Component Value Date/Time    Calcium 7.9 (L) 03/22/2023 03:27 AM       No results found for: CHOL, CHOLPOCT, CHOLX, CHLST, CHOLV, TOTCHOLEXT, HDL, HDLPOC, HDLEXT, HDLP, LDL, LDLCPOC, LDLCEXT, LDLC, DLDLP, VLDLC, VLDL, TGLX, TRIGL, TRIGLYCEXT, TRIGP, TGLPOCT, CHHD, CHHDX      Nutr. Meds:  Zovirax, colace, LR @ 50, zofran PRN, Protonix, Crestor,       Current Nutrition Intake & Therapies:  Average Meal Intake: Unable to assess (No documentation)  Average Supplement Intake: None ordered  ADULT DIET Regular    Anthropometric Measures:  Height: 5' 6\" (167.6 cm)  Ideal Body Weight (IBW): 130 lbs (59 kg)  Admission Body Weight: 114 lb (stated)  Current Body Wt:  58.2 kg (128 lb 3.2 oz), 98.6 % IBW.  Bed scale  Current BMI (kg/m2): 20.7        Weight Adjustment: No adjustment                 BMI Category: Underweight (BMI less than 22) age over 72    Estimated Daily Nutrient Needs:  Energy Requirements Based On: Kcal/kg  Weight Used for Energy Requirements: Current  Energy (kcal/day): 1746 (30 kcal/kg)  Weight Used for Protein Requirements: Current  Protein (g/day): 59-70 (1.0-1.2 g/kg)  Method Used for Fluid Requirements: 1 ml/kcal  Fluid (ml/day): 1746    Nutrition Diagnosis:   Increased nutrient needs related to acute injury/trauma as evidenced by  (pubic ramus fracture)    Nutrition Interventions:   Food and/or Nutrient Delivery: Continue current diet, Start oral nutrition supplement  Nutrition Education/Counseling: No recommendations at this time  Coordination of Nutrition Care: Continue to monitor while inpatient  Plan of Care discussed with: patient    Goals:     Goals: Meet at least 75% of estimated needs, by next RD assessment       Nutrition Monitoring and Evaluation:   Behavioral-Environmental Outcomes: None identified  Food/Nutrient Intake Outcomes: Food and nutrient intake, Supplement intake  Physical Signs/Symptoms Outcomes: Biochemical data, Weight    Discharge Planning:     Too soon to determine    Becky Esparza MS, RD  Contact: Ext: [phone], or via [employer]

## 2023-03-22 NOTE — PROGRESS NOTES
Physician Progress Note      Sae Holcomb  CSN #:                  [id number]  :                       1950  ADMIT DATE:       3/21/2023 10:54 AM  DISCH DATE:  RESPONDING  PROVIDER #:        BIBI KANG DO          QUERY TEXT:    Good Afternoon    This patient admitted on 2023- present for Left sacral ala and left superior and inferior pubic rami. The patient is noted to be on denosumab PTA for post-menopausal osteoporosis. If possible, please document in progress notes and discharge summary if you are evaluating and/or treating any of the following: The medical record reflects the following:  Risk Factors: Female Age, (72)-, Pt is noted to be on denosumab at home PTA for post -menopausal osteoporosis  Clinical Indicators: Pt reports she fell while going to bathroom and using her rollator and fell hit the left side of her hip.  CT pelvic showed fractures of left sacral ala, left superior and inferior pubic rami, pt is on denosumab  at home  Treatment: Orthro consulted, CXR of femur, CT of Spine,    Thank you,  Fazal Finch, DOMINICN,RN, CPHQ, CCDS, SMART  Options provided:  -- Pathological left sacral ala and left superior and inferior pubic rami  fracture due to osteopenia  -- Pathological left sacral ala and left superior and inferior pubic fracture due to osteopenia following fall which would not usually break a normal, healthy bone  -- Osteoporotic left sacral ala and left superior and inferior pubic rami Fracture  -- Osteoporotic left sacral ala and left superior and inferior pubic rami fracture following fall which would not usually break a normal, healthy bone  -- Traumatic left sacral ala and left superior and inferior pubic rami fracture  -- Other - I will add my own diagnosis  -- Disagree - Not applicable / Not valid  -- Disagree - Clinically unable to determine / Unknown  -- Refer to Clinical Documentation Reviewer    PROVIDER RESPONSE TEXT:    This patient has a traumatic left sacral ala and left superior and inferior pubic rami fracture.     Query created by: Corbin Lozada on 3/22/2023 12:40 PM      Electronically signed by:  Yuri Cervantes DO 3/22/2023 1:23 PM

## 2023-03-22 NOTE — CONSULTS
ORTHOPEDIC SURGERY CONSULT    Subjective:     Date of Consultation:  March 22, 2023    Referring Physician:  Dr. Nikki Lundberg is a 67 y.o. female who is being seen for left superior and inferior pubic rami fractures and a left sacral ala fracture. She presented to the [facility] ER yesterday after suffering a ground level fall from her rollator. She uses a rollator dur to multiple lumbar surgeries by Dr. Dominguez Milner with [facility]. She repors she was going to the bathroom when she tripped and fell on her left side. She was found with the above fractures and we have been asked to manage these. She denies a history of pelvic fractures. Denies hematuria. No LOC or head trauma. Patient Active Problem List    Diagnosis Date Noted    Pubic ramus fracture ([address] 03/21/2023     History reviewed. No pertinent family history. Social History     Tobacco Use    Smoking status: Never    Smokeless tobacco: Never   Substance Use Topics    Alcohol use: Not Currently     Alcohol/week: 0.0 standard drinks     Comment: none     Past Medical History:   Diagnosis Date    Adverse effect of anesthesia     per PCP patient had bladder retention after anesthesia with skin cancer surgery, under treatment with urologist. 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  Authorizing Provider busPIRone (BUSPAR) 30 mg tablet Take 30 mg by mouth two (2) times a day. Yes Other, MD Lizandro   apixaban (Eliquis) 5 mg tablet Take 5 mg by mouth two (2) times a day. Yes Other, MD Lizandro   acyclovir (ZOVIRAX) 200 mg capsule Take 200 mg by mouth daily. Yes Other, MD Lizandro   naltrexone (DEPADE) 50 mg tablet Take 50 mg by mouth daily. Yes Other, MD Lizandro   docusate sodium (COLACE) 100 mg capsule Take 100 mg by mouth as needed. Yes Other, MD Lizandro   topiramate (TOPAMAX) 100 mg tablet Take 1 tablet by mouth nightly 11/16/22  Yes Jacki Hooker MD   rosuvastatin (CRESTOR) 10 mg tablet Take 10 mg by mouth nightly. Yes Provider, Historical   gabapentin (NEURONTIN) 300 mg capsule TAKE 1 CAPSULE BY MOUTH THREE TIMES DAILY 4/12/21  Yes Provider, Historical   omeprazole (PRILOSEC) 40 mg capsule Take 40 mg by mouth daily. Yes Provider, Historical   traZODone (DESYREL) 100 mg tablet Take 300 mg by mouth nightly. Indications: insomnia   Yes Provider, Historical   latanoprost (XALATAN) 0.005 % ophthalmic solution Administer 1 Drop to both eyes nightly. Yes Provider, Historical   denosumab (PROLIA) 60 mg/mL injection 60 mg by SubCUTAneous route every 6 months. Indications: POST-MENOPAUSAL OSTEOPOROSIS   Yes Provider, Historical   cycloSPORINE (RESTASIS) 0.05 % dpet Administer 1 Drop to both eyes every twelve (12) hours. Patient not taking: Reported on 3/22/2023    Other, MD Lizandro   cholecalciferol (VITAMIN D3) 25 mcg (1,000 unit) cap Take 1,000 Units by mouth daily. Patient not taking: Reported on 3/22/2023    Other, MD Lizandro   Bifidobacterium Infantis (Align) 4 mg cap Take  by mouth. Patient not taking: Reported on 3/22/2023    Other, MD Lizandro   coenzyme q10 10 mg cap Take  by mouth. Patient not taking: Reported on 3/22/2023    Other, MD Lizandro   ginkgo biloba leaf extract 60 mg tab Take  by mouth.   Patient not taking: Reported on 3/22/2023    Other, MD Lizandro   lisinopril (PRINIVIL, ZESTRIL) 10 mg tablet Take 5 mg by mouth daily. Patient not taking: No sig reported    Provider, Historical   peg 400-propylene glycol (SYSTANE) 0.4-0.3 % drop Administer 1 Drop to both eyes as needed. Patient not taking: Reported on 3/22/2023    Provider, Historical     Current Facility-Administered Medications   Medication Dose Route Frequency    [START ON 3/23/2023] acyclovir (ZOVIRAX) capsule 200 mg  200 mg Oral DAILY    acetaminophen (TYLENOL) tablet 650 mg  650 mg Oral Q6H    sodium chloride (NS) flush 5-40 mL  5-40 mL IntraVENous Q8H    sodium chloride (NS) flush 5-40 mL  5-40 mL IntraVENous PRN    morphine injection 2 mg  2 mg IntraVENous Q4H PRN    naloxone (NARCAN) injection 0.4 mg  0.4 mg IntraVENous PRN    ondansetron (ZOFRAN) injection 4 mg  4 mg IntraVENous Q4H PRN    oxyCODONE IR (ROXICODONE) tablet 5 mg  5 mg Oral Q4H PRN    lactated Ringers infusion  50 mL/hr IntraVENous CONTINUOUS    busPIRone (BUSPAR) tablet 30 mg  30 mg Oral DAILY    docusate sodium (COLACE) capsule 100 mg  100 mg Oral BID    gabapentin (NEURONTIN) capsule 300 mg  300 mg Oral TID    latanoprost (XALATAN) 0.005 % ophthalmic solution 1 Drop  1 Drop Both Eyes QHS    pantoprazole (PROTONIX) tablet 40 mg  40 mg Oral ACB    topiramate (TOPAMAX) tablet 100 mg  100 mg Oral QHS    traZODone (DESYREL) tablet 300 mg  300 mg Oral QHS    rosuvastatin (CRESTOR) tablet 10 mg  10 mg Oral QHS    [Held by provider] naltrexone (DEPADE) tablet 50 mg  50 mg Oral DAILY     Allergies   Allergen Reactions    Ciprofibrate Myalgia and Other (comments)     Sore and aching all over    Diclofenac Nausea and Vomiting        Review of Systems:  Pertinent items are noted in HPI.     Objective:     Patient Vitals for the past 8 hrs:   BP Temp Pulse Resp SpO2 Height Weight   03/22/23 1300 -- -- -- -- -- -- 58.2 kg (128 lb 3.2 oz)   03/22/23 1238 118/65 98.4 °F (36.9 °C) 98 16 95 % -- --   03/22/23 0817 -- -- -- -- -- 5' 6\" (1.676 m) --   03/22/23 0802 (!) 93/59 97.8 °F (36.6 °C) 95 14 93 % -- --     Temp (24hrs), Av °F (36.7 °C), Min:97.6 °F (36.4 °C), Max:98.4 °F (36.9 °C)        EXAM: GEN: Well appearing female in NAD  PSYCH:  AAO x 4. She had to be redirected  multiple times  MUSC/NEURO: Left leg and lower leg without ecchymosis or erythema. Skin is intact. No pain with log roll of the left lower extremity. No pain with knee flexion and extension. No left knee effusion. Knee is ligamentously stable with valgus and varus stress test at 0 and 30 degrees of flexion. Moderate to severe pain with pelvic compression. SILT LLE.  +DP and Pt pulses. Left posterior upper arm with ecchymosis. No deformity noted. ROM of the left elbow 0-120 without pain passively. Left shoulder without effusion. 175/45. Negative ERLS left shoulder. +radial and ulnar pulses. IMAGING:  INDICATION:  pelvis fx      EXAM: CT pelvis. Comparison radiographs same day. Thin section axial images were obtained. From these sagittal and coronal  reformats were performed. CT dose reduction was achieved through use of a  standardized protocol tailored for this examination and automatic exposure  control for dose modulation. FINDINGS: Acute nondisplaced sagittally oriented fracture through the left  sacral ala. Acute fractures of the left superior and inferior pubic rami. Overall bone mineral density is decreased. The patient has a right total hip arthroplasty. No periprosthetic fracture or  loosening. No dislocation. Minimal degenerative changes of the left hip. There are postoperative changes in the lower lumbar spine. Visualized bowel is  nondistended. Bladder is distended. There is extensive vascular calcifications     IMPRESSION  1.  Insufficiency fractures of the left sacral ala and left superior and inferior  pubic rami       Data Review   Recent Results (from the past 24 hour(s))   URINALYSIS W/ REFLEX CULTURE    Collection Time: 23  5:50 PM    Specimen: Urine   Result Value Ref Range Color YELLOW/STRAW      Appearance CLEAR CLEAR      Specific gravity 1.005 1.003 - 1.030      pH (UA) 7.5 5.0 - 8.0      Protein Negative NEG mg/dL    Glucose Negative NEG mg/dL    Ketone Negative NEG mg/dL    Bilirubin Negative NEG      Blood TRACE (A) NEG      Urobilinogen 0.2 0.2 - 1.0 EU/dL    Nitrites Negative NEG      Leukocyte Esterase MODERATE (A) NEG      WBC 0-4 0 - 4 /hpf    RBC 0-5 0 - 5 /hpf    Epithelial cells FEW FEW /lpf    Bacteria Negative NEG /hpf    UA:UC IF INDICATED CULTURE NOT INDICATED BY UA RESULT CNI     RESPIRATORY VIRUS PANEL W/COVID-19, PCR    Collection Time: 03/21/23  5:50 PM    Specimen: Nasopharyngeal   Result Value Ref Range    Adenovirus Not detected NOTD      Coronavirus 229E Not detected NOTD      Coronavirus HKU1 Not detected NOTD      Coronavirus CVNL63 Not detected NOTD      Coronavirus OC43 Not detected NOTD      SARS-CoV-2, PCR Not detected NOTD      Metapneumovirus Not detected NOTD      Rhinovirus and Enterovirus Not detected NOTD      Influenza A Not detected NOTD      Influenza B Not detected NOTD      Parainfluenza 1 Not detected NOTD      Parainfluenza 2 Not detected NOTD      Parainfluenza 3 Not detected NOTD      Parainfluenza virus 4 Not detected NOTD      RSV by PCR Not detected NOTD      B. parapertussis, PCR Not detected NOTD      Bordetella pertussis - PCR Not detected NOTD      Chlamydophila pneumoniae DNA, QL, PCR Not detected NOTD      Mycoplasma pneumoniae DNA, QL, PCR Not detected NOTD     CBC WITH AUTOMATED DIFF    Collection Time: 03/22/23  3:27 AM   Result Value Ref Range    WBC 6.7 3.6 - 11.0 K/uL    RBC 3.69 (L) 3.80 - 5.20 M/uL    HGB 12.5 11.5 - 16.0 g/dL    HCT 38.1 35.0 - 47.0 %    .3 (H) 80.0 - 99.0 FL    MCH 33.9 26.0 - 34.0 PG    MCHC 32.8 30.0 - 36.5 g/dL    RDW 13.9 11.5 - 14.5 %    PLATELET 782 (L) 410 - 400 K/uL    MPV 10.0 8.9 - 12.9 FL    NRBC 0.0 0  WBC    ABSOLUTE NRBC 0.00 0.00 - 0.01 K/uL    NEUTROPHILS 68 32 - 75 % LYMPHOCYTES 19 12 - 49 %    MONOCYTES 9 5 - 13 %    EOSINOPHILS 3 0 - 7 %    BASOPHILS 0 0 - 1 %    IMMATURE GRANULOCYTES 1 (H) 0.0 - 0.5 %    ABS. NEUTROPHILS 4.5 1.8 - 8.0 K/UL    ABS. LYMPHOCYTES 1.3 0.8 - 3.5 K/UL    ABS. MONOCYTES 0.6 0.0 - 1.0 K/UL    ABS. EOSINOPHILS 0.2 0.0 - 0.4 K/UL    ABS. BASOPHILS 0.0 0.0 - 0.1 K/UL    ABS. IMM. GRANS. 0.0 0.00 - 0.04 K/UL    DF AUTOMATED     METABOLIC PANEL, BASIC    Collection Time: 03/22/23  3:27 AM   Result Value Ref Range    Sodium 135 (L) 136 - 145 mmol/L    Potassium 3.8 3.5 - 5.1 mmol/L    Chloride 105 97 - 108 mmol/L    CO2 25 21 - 32 mmol/L    Anion gap 5 5 - 15 mmol/L    Glucose 107 (H) 65 - 100 mg/dL    BUN 10 6 - 20 MG/DL    Creatinine 0.57 0.55 - 1.02 MG/DL    BUN/Creatinine ratio 18 12 - 20      eGFR >60 >60 ml/min/1.73m2    Calcium 7.9 (L) 8.5 - 10.1 MG/DL   MAGNESIUM    Collection Time: 03/22/23  3:27 AM   Result Value Ref Range    Magnesium 1.9 1.6 - 2.4 mg/dL         Assessment/Plan:   A: 1. Left superior pubic rami fracture, non displaced  2. Left inferior pubic rami fracture, nondisplaced  3. Left sacral ala fracture, nondisplaced    P: 1. Pelvic fractures. She can WBAT with therapy. She will need to be premedicated prior to therapy. If worsening pain for the left side consider decreasing sitting to 30 minutes. Repeat imaging of the pelvis to be done inpatient when she has ambulated to evaluate for displaced and widening of the PS. 2. DVT ppx: high risk. On AC at home with Eliquis. Resume when primary team is ok with this. From fractures, there is no surgical intervention need and expect a 1-3 point drop in HgB from these fractures. 3. DISPO: Will need snf. 4. If worsening lumbar pan would recommend plain xrays first.  5. Orthopedics to follow for now. Discussed case with Dr. Marie Michael who agrees with plan.          Bennie Garcia, [address] [facility]  [facility]  [address] [facility]

## 2023-03-22 NOTE — PROGRESS NOTES
Problem: Falls - Risk of  Goal: *Absence of Falls  Description: Document Eliud Avalos Fall Risk and appropriate interventions in the flowsheet. 3/22/2023 5660 by Aysha Jacobs RN  Outcome: Progressing Towards Goal  Note: Fall Risk Interventions:                             3/22/2023 8412 by Aysha Jacobs RN  Outcome: Progressing Towards Goal  Note: Fall Risk Interventions:                                Problem: Pressure Injury - Risk of  Goal: *Prevention of pressure injury  Description: Document Cortez Scale and appropriate interventions in the flowsheet. 3/22/2023 3861 by Aysha Jacobs RN  Outcome: Progressing Towards Goal  Note: Pressure Injury Interventions: Activity Interventions: Increase time out of bed, PT/OT evaluation    Mobility Interventions: HOB 30 degrees or less    Nutrition Interventions: Document food/fluid/supplement intake                  3/22/2023 8674 by Aysha Jacobs RN  Outcome: Progressing Towards Goal  Note: Pressure Injury Interventions:             Activity Interventions: Increase time out of bed, PT/OT evaluation    Mobility Interventions: HOB 30 degrees or less    Nutrition Interventions: Document food/fluid/supplement intake                     Problem: Patient Education: Go to Patient Education Activity  Goal: Patient/Family Education  3/22/2023 5548 by Aysha Jacobs RN  Outcome: Progressing Towards Goal  3/22/2023 0639 by Aysha Jacobs RN  Outcome: Progressing Towards Goal     Problem: Pain  Goal: *Control of Pain  Outcome: Progressing Towards Goal

## 2023-03-22 NOTE — PROGRESS NOTES
3/22/2023  12:18 PM  CM completed assessment w/ pt in person. Charted demographics verified, pt lives w/spouse  Derrick Son (C) [phone] in a 1 story home  there are no entry steps. At baseline pt is ambulatory w/ a rollator, spouse assists her w/ bathing and dressing. Pt had a fall at home leading to this admission, pt does not drives and family provides transportation. Reason for Admission:  Emergent for Pubic Ramus Fracture, Fall   Pt is a 68 yo female who presents to [facility] c/o L Hip, shoulder and rib pain following fall at home .   Past Medical History:   Diagnosis Date    Adverse effect of anesthesia       per PCP patient had bladder retention after anesthesia with skin cancer surgery, under treatment with urologist. 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    Psychiatric disorder       depression and anxiety                      RUR Score:     11 % Low Risk of readmission/Green                Plan for utilizing home health:     Pt denies Hx of Rehab and states she was ready to start [facility], PT, OT evals pending  DME: rollator  Rx: [employer] pt uses [employer] and is usually covered for her medications       PCP: First and Last name:  Didier Hill MD     Name of Practice:    Are you a current patient: Yes/No: yes    Approximate date of last visit: 2 weeks ago    Can you participate in a virtual visit with your PCP: yes                     Current Advanced Directive/Advance Care Plan: Full Code  HCDM/NOK spouse Perez Alvarez (C) [phone]    Healthcare Decision Maker:   Click here to complete [address] including selection of the Healthcare Decision Maker Relationship (ie \"Primary\") Transition of Care Plan:         RUR 11 % Low Risk of readmission/green  LOS 1 Day        Hospital admission for medical management  Ortho  consult   PT, OT evals pending   CM to follow through for treatment/response  DC when medically stable, dispo pending therapy evals and progress     Outpatient follow up Ortho, PCP  Transport TD pending progress  CM will continue to follow and assist w/ DC needs   Care Management Interventions  PCP Verified by CM: Yes (Delano Chua MD )  Palliative Care Criteria Met (RRAT>21 & CHF Dx)?: No  Mode of Transport at Discharge:  Other (see comment) (TBD)  Physical Therapy Consult: Yes  Occupational Therapy Consult: Yes  Support Systems: Spouse/Significant Other  Confirm Follow Up Transport: Family  Discharge Location  Patient Expects to be Discharged to[de-identified] Unable to determine at this time  Sahara Humphrey, [facility]

## 2023-03-22 NOTE — PROGRESS NOTES
[facility]  [address] [facility], [address] [place] [address]  [phone]      Medical Progress Note      NAME: Lesvia Escobar   :    MRM:  [id number]    Date/Time of service: 3/22/2023         Subjective:     Chief Complaint:  Patient was personally seen and examined by me during this time period. Chart reviewed. Fu fall. Fu left sacral ala and left super and inferior pubic rami fractures. Also found to have left 3rd rib fracture. Complains of some left rib pain. Also with sacral pain. Evaluated with orthopedics at bedside today. Objective:       Vitals:       Last 24hrs VS reviewed since prior progress note.  Most recent are:    Visit Vitals  /65 (BP 1 Location: Left upper arm, BP Patient Position: At rest;Semi fowlers)   Pulse 98   Temp 98.4 °F (36.9 °C)   Resp 16   Ht 5' 6\" (1.676 m)   Wt 58.2 kg (128 lb 3.2 oz)   SpO2 95%   BMI 20.69 kg/m²     SpO2 Readings from Last 6 Encounters:   23 95%   22 98%   21 97%   17 92%   12 98%    O2 Flow Rate (L/min): 2 l/min     Intake/Output Summary (Last 24 hours) at 3/22/2023 1318  Last data filed at 3/22/2023 4588  Gross per 24 hour   Intake 780.83 ml   Output 1200 ml   Net -419.17 ml        Exam:     Physical Exam:    Gen: Elderly, in no acute distress  HEENT:  Pink conjunctivae, PERRL, hearing intact to voice, moist mucous membranes  Neck:  Supple, no tenderness to palpation   Resp:  No accessory muscle use, clear breath sounds without wheezes rales or rhonchi  Card:  RRR, No murmurs, normal S1, S2, no peripheral edema  Abd:  Soft, non-tender, non-distended, normoactive bowel sounds are present  Musc: Limited range of motion of left side  Skin:  No rashes or ulcers, skin turgor is good  Neuro:  Cranial nerves 3-12 are grossly intact, follows commands appropriately  Psych:  Oriented to person, place, and time, Alert with good insight    Medications Reviewed: (see below)    Lab Data Reviewed: (see below)    ______________________________________________________________________    Medications:     Current Facility-Administered Medications   Medication Dose Route Frequency    acetaminophen (TYLENOL) tablet 650 mg  650 mg Oral Q6H    sodium chloride (NS) flush 5-40 mL  5-40 mL IntraVENous Q8H    sodium chloride (NS) flush 5-40 mL  5-40 mL IntraVENous PRN    morphine injection 2 mg  2 mg IntraVENous Q4H PRN    naloxone (NARCAN) injection 0.4 mg  0.4 mg IntraVENous PRN    ondansetron (ZOFRAN) injection 4 mg  4 mg IntraVENous Q4H PRN    oxyCODONE IR (ROXICODONE) tablet 5 mg  5 mg Oral Q4H PRN    lactated Ringers infusion  50 mL/hr IntraVENous CONTINUOUS    acyclovir (ZOVIRAX) capsule 200 mg  200 mg Oral Q4HWA    busPIRone (BUSPAR) tablet 30 mg  30 mg Oral DAILY    cycloSPORINE (RESTASIS) 0.05 % ophthalmic emulsion 1 Drop (Patient Supplied)  1 Drop Both Eyes Q12H    docusate sodium (COLACE) capsule 100 mg  100 mg Oral BID    gabapentin (NEURONTIN) capsule 300 mg  300 mg Oral TID    latanoprost (XALATAN) 0.005 % ophthalmic solution 1 Drop  1 Drop Both Eyes QHS    pantoprazole (PROTONIX) tablet 40 mg  40 mg Oral ACB    topiramate (TOPAMAX) tablet 100 mg  100 mg Oral QHS    traZODone (DESYREL) tablet 300 mg  300 mg Oral QHS    rosuvastatin (CRESTOR) tablet 10 mg  10 mg Oral QHS    [Held by provider] naltrexone (DEPADE) tablet 50 mg  50 mg Oral DAILY          Lab Review:     Recent Labs     03/22/23  0327 03/21/23  1342   WBC 6.7 8.2   HGB 12.5 13.0   HCT 38.1 37.8   * 167     Recent Labs     03/22/23  0327 03/21/23  1342   * 136   K 3.8 3.4*    101   CO2 25 26   * 113*   BUN 10 6   CREA 0.57 0.59   CA 7.9* 7.8*   MG 1.9  --      No results found for: GLUCPOC       Assessment / Plan:     Fall POA: Denies any cardiac or neurological prodrome. PT/OT once cleared by Ortho. Left sacral ala and left superior and inferior pubic rami POA: Due to fall. Scheduled Tylenol.   As needed oxycodone. As needed IV morphine. Discussed with ortho waiting plan for conservative versus operative management. orthopedics following. Concern for left rotator cuff injury POA: CT shoulder with mild acromioclavicular and glenohumeral osteoarthritis but there do not appear to be an acute concerns. Ortho following. PT/OT      Respiratory insufficiency POA: Requiring 2 L nasal cannula on admission; now weaned off. Possibly due to rib fracture on CT. chest x-ray unremarkable. Respiratory viral panel neg. Encouraged incentive spirometer. Hx of PE: hold elqiuis pending ortho plan      Hx of alcohol abuse: hold home naltrexone for now to allow opioid efficiency     HTN: BP on lower end of normal. Hold home lisinopril     Hyperlipidemia: Continue statin. Anxiety/depression POA: Continue home Topamax, trazodone and BuSpar.     Chronic back pain: continue home gabapentin     GERD: PPI      Glaucoma: continue eyedrops    Total time spent with patient: 28 Minutes **I personally saw and examined the patient during this time period**                 Care Plan discussed with: Patient, Nursing Staff, and Consultant/Specialist    Discussed:  Care Plan    Prophylaxis:  SCD's, hold home Eliquis pending Ortho eval    Disposition:  SNF/LTC           ___________________________________________________    Attending Physician: Nora Khan DO

## 2023-03-22 NOTE — PROGRESS NOTES
Bedside shift change report given to [facility] Jorge RN (oncoming nurse) by Ji Fowler RN (offgoing nurse). Report included the following information SBAR, Kardex, Intake/Output, MAR, and Recent Results. The opportunity for questions was presented.

## 2023-03-23 PROCEDURE — 74011250637 HC RX REV CODE- 250/637: Performed by: INTERNAL MEDICINE

## 2023-03-23 PROCEDURE — 74011250636 HC RX REV CODE- 250/636: Performed by: INTERNAL MEDICINE

## 2023-03-23 PROCEDURE — 97530 THERAPEUTIC ACTIVITIES: CPT

## 2023-03-23 PROCEDURE — 97162 PT EVAL MOD COMPLEX 30 MIN: CPT

## 2023-03-23 PROCEDURE — 97535 SELF CARE MNGMENT TRAINING: CPT

## 2023-03-23 PROCEDURE — 77030040361 HC SLV COMPR DVT MDII -B

## 2023-03-23 PROCEDURE — 97116 GAIT TRAINING THERAPY: CPT

## 2023-03-23 PROCEDURE — 97165 OT EVAL LOW COMPLEX 30 MIN: CPT

## 2023-03-23 PROCEDURE — 65270000029 HC RM PRIVATE

## 2023-03-23 PROCEDURE — 74011000250 HC RX REV CODE- 250: Performed by: INTERNAL MEDICINE

## 2023-03-23 RX ADMIN — TRAZODONE HYDROCHLORIDE 300 MG: 100 TABLET ORAL at 20:26

## 2023-03-23 RX ADMIN — SODIUM CHLORIDE, PRESERVATIVE FREE 10 ML: 5 INJECTION INTRAVENOUS at 22:00

## 2023-03-23 RX ADMIN — ACYCLOVIR 200 MG: 200 CAPSULE ORAL at 09:59

## 2023-03-23 RX ADMIN — APIXABAN 5 MG: 5 TABLET, FILM COATED ORAL at 17:08

## 2023-03-23 RX ADMIN — SODIUM CHLORIDE, PRESERVATIVE FREE 10 ML: 5 INJECTION INTRAVENOUS at 17:09

## 2023-03-23 RX ADMIN — DOCUSATE SODIUM 100 MG: 100 CAPSULE, LIQUID FILLED ORAL at 10:00

## 2023-03-23 RX ADMIN — GABAPENTIN 300 MG: 300 CAPSULE ORAL at 20:26

## 2023-03-23 RX ADMIN — SODIUM CHLORIDE, PRESERVATIVE FREE 10 ML: 5 INJECTION INTRAVENOUS at 06:42

## 2023-03-23 RX ADMIN — GABAPENTIN 300 MG: 300 CAPSULE ORAL at 17:08

## 2023-03-23 RX ADMIN — APIXABAN 5 MG: 5 TABLET, FILM COATED ORAL at 10:00

## 2023-03-23 RX ADMIN — SODIUM CHLORIDE, POTASSIUM CHLORIDE, SODIUM LACTATE AND CALCIUM CHLORIDE 50 ML/HR: 600; 310; 30; 20 INJECTION, SOLUTION INTRAVENOUS at 20:22

## 2023-03-23 RX ADMIN — ACETAMINOPHEN 650 MG: 325 TABLET ORAL at 06:42

## 2023-03-23 RX ADMIN — LATANOPROST 1 DROP: 50 SOLUTION OPHTHALMIC at 20:26

## 2023-03-23 RX ADMIN — ACETAMINOPHEN 650 MG: 325 TABLET ORAL at 17:08

## 2023-03-23 RX ADMIN — DOCUSATE SODIUM 100 MG: 100 CAPSULE, LIQUID FILLED ORAL at 17:08

## 2023-03-23 RX ADMIN — ACETAMINOPHEN 650 MG: 325 TABLET ORAL at 11:40

## 2023-03-23 RX ADMIN — OXYCODONE HYDROCHLORIDE 5 MG: 5 TABLET ORAL at 10:00

## 2023-03-23 RX ADMIN — ROSUVASTATIN CALCIUM 10 MG: 10 TABLET, FILM COATED ORAL at 20:26

## 2023-03-23 RX ADMIN — PANTOPRAZOLE SODIUM 40 MG: 40 TABLET, DELAYED RELEASE ORAL at 06:42

## 2023-03-23 RX ADMIN — GABAPENTIN 300 MG: 300 CAPSULE ORAL at 09:59

## 2023-03-23 RX ADMIN — TOPIRAMATE 100 MG: 100 TABLET, FILM COATED ORAL at 20:46

## 2023-03-23 NOTE — PROGRESS NOTES
[facility]  [address] [facility], [address] [place] [address]  [phone]    [facility]                                                                                          Hospitalist Progress Note  Elisabeth Hector MD        Date of Service:  3/23/2023  NAME:  Dorinda Munoz  :    MRN:  [id number]      Admission Summary:   67-year-old female presented after a fall and found to have multiple pubic rami fractures    Interval history / Subjective:   States pain is minimal when she is laying flat     Assessment & Plan:     Left sacral ala and left superior and inferior pubic rami fractures  -Continue scheduled Tylenol and oxycodone as needed as well as IV morphine as needed for pain  -Ortho evaluated, plan for conservative management for now  -PT/OT    Fall  -Continue PT/OT  -Will need SNF on discharge    History of PE  -Resume Eliquis    History of alcohol abuse  -Hold home naltrexone for now to ensure adequate pain control    Anxiety and depression  -Continue BuSpar, trazodone, Topamax, gabapentin    Hyperlipidemia  -Continue Crestor    GERD  -Continue PPI        Outisde Records, prior notes, labs, radiology, and medications reviewed     Code status: Full code  DVT prophylaxis: [address] Problems  Date Reviewed: 2021            Codes Class Noted POA    Pubic ramus fracture ([facility] [address] ICD-10-CM: F72.168S  ICD-9-CM: 808.2  3/21/2023 Unknown             Review of Systems:   A comprehensive review of systems was negative except for that written in the HPI. Vital Signs:    Last 24hrs VS reviewed since prior progress note.  Most recent are:  Visit Vitals  BP 96/65 (BP 1 Location: Left upper arm, BP Patient Position: Sitting)   Pulse 98   Temp 97.5 °F (36.4 °C)   Resp 18   Ht 5' 6\" (1.676 m)   Wt 58.2 kg (128 lb 3.2 oz)   SpO2 95%   BMI 20.69 kg/m²         Intake/Output Summary (Last 24 hours) at 3/23/2023 1407  Last data filed at 3/23/2023 0645  Gross per 24 hour   Intake 500 ml   Output 1500 ml   Net -1000 ml        Physical Examination:       Gen: Elderly, in no acute distress  HEENT:  Pink conjunctivae, PERRL, hearing intact to voice, moist mucous membranes  Neck:  Supple, no tenderness to palpation   Resp:  No accessory muscle use, clear breath sounds without wheezes rales or rhonchi  Card:  RRR, No murmurs, normal S1, S2, no peripheral edema  Abd:  Soft, non-tender, non-distended, normoactive bowel sounds are present  Musc: Limited range of motion of left side  Skin:  No rashes or ulcers, skin turgor is good  Neuro:  Cranial nerves 3-12 are grossly intact, follows commands appropriately  Psych:  Oriented to person, place, and time, Alert with good insight       Data Review:    Review and/or order of clinical lab test      Labs:     Recent Labs     03/22/23 0327 03/21/23  1342   WBC 6.7 8.2   HGB 12.5 13.0   HCT 38.1 37.8   * 167     Recent Labs     03/22/23 0327 03/21/23  1342   * 136   K 3.8 3.4*    101   CO2 25 26   BUN 10 6   CREA 0.57 0.59   * 113*   CA 7.9* 7.8*   MG 1.9  --      No results for input(s): ALT, AP, TBIL, TBILI, TP, ALB, GLOB, GGT, AML, LPSE in the last 72 hours. No lab exists for component: SGOT, GPT, AMYP, HLPSE  No results for input(s): INR, PTP, APTT, INREXT in the last 72 hours. No results for input(s): FE, TIBC, PSAT, FERR in the last 72 hours. No results found for: FOL, RBCF   No results for input(s): PH, PCO2, PO2 in the last 72 hours. No results for input(s): CPK, CKNDX, TROIQ in the last 72 hours.     No lab exists for component: CPKMB  No results found for: CHOL, CHOLX, CHLST, CHOLV, HDL, HDLP, LDL, LDLC, DLDLP, TGLX, TRIGL, TRIGP, CHHD, CHHDX  No results found for: [facility]  Lab Results   Component Value Date/Time    Color YELLOW/STRAW 03/21/2023 05:50 PM    Appearance CLEAR 03/21/2023 05:50 PM    Specific gravity 1.005 03/21/2023 05:50 PM    pH (UA) 7.5 03/21/2023 05:50 PM    Protein Negative 03/21/2023 05:50 PM    Glucose Negative 03/21/2023 05:50 PM    Ketone Negative 03/21/2023 05:50 PM    Bilirubin Negative 03/21/2023 05:50 PM    Urobilinogen 0.2 03/21/2023 05:50 PM    Nitrites Negative 03/21/2023 05:50 PM    Leukocyte Esterase MODERATE (A) 03/21/2023 05:50 PM    Epithelial cells FEW 03/21/2023 05:50 PM    Bacteria Negative 03/21/2023 05:50 PM    WBC 0-4 03/21/2023 05:50 PM    RBC 0-5 03/21/2023 05:50 PM         Medications Reviewed:     Current Facility-Administered Medications   Medication Dose Route Frequency    acyclovir (ZOVIRAX) capsule 200 mg  200 mg Oral DAILY    apixaban (ELIQUIS) tablet 5 mg  5 mg Oral BID    acetaminophen (TYLENOL) tablet 650 mg  650 mg Oral Q6H    sodium chloride (NS) flush 5-40 mL  5-40 mL IntraVENous Q8H    sodium chloride (NS) flush 5-40 mL  5-40 mL IntraVENous PRN    morphine injection 2 mg  2 mg IntraVENous Q4H PRN    naloxone (NARCAN) injection 0.4 mg  0.4 mg IntraVENous PRN    ondansetron (ZOFRAN) injection 4 mg  4 mg IntraVENous Q4H PRN    oxyCODONE IR (ROXICODONE) tablet 5 mg  5 mg Oral Q4H PRN    lactated Ringers infusion  50 mL/hr IntraVENous CONTINUOUS    busPIRone (BUSPAR) tablet 30 mg  30 mg Oral DAILY    docusate sodium (COLACE) capsule 100 mg  100 mg Oral BID    gabapentin (NEURONTIN) capsule 300 mg  300 mg Oral TID    latanoprost (XALATAN) 0.005 % ophthalmic solution 1 Drop  1 Drop Both Eyes QHS    pantoprazole (PROTONIX) tablet 40 mg  40 mg Oral ACB    topiramate (TOPAMAX) tablet 100 mg  100 mg Oral QHS    traZODone (DESYREL) tablet 300 mg  300 mg Oral QHS    rosuvastatin (CRESTOR) tablet 10 mg  10 mg Oral QHS    [Held by provider] naltrexone (DEPADE) tablet 50 mg  50 mg Oral DAILY     ______________________________________________________________________  EXPECTED LENGTH OF STAY: 2d 16h  ACTUAL LENGTH OF STAY:          2                 Gaurav Lozano MD

## 2023-03-23 NOTE — PROGRESS NOTES
Problem: Self Care Deficits Care Plan (Adult)  Goal: *Acute Goals and Plan of Care (Insert Text)  Description: FUNCTIONAL STATUS PRIOR TO ADMISSION: Patient was modified independent using a rollator for functional mobility. Patient required minimal assistance for basic and instrumental ADLs reports  was doing most of cooking/cleaning, housework, and occasionally helping her at setup level with ADLs. HOME SUPPORT: The patient lived with spouse and required minimal assistance/contact guard assist for ADLs and IADLs. Occupational Therapy Goals  Initiated 3/23/2023  1. Patient will perform grooming sitting EOB for 3 minutes without LOB supervision/set-up within 7 day(s). 2.  Patient will perform lower body dressing with moderate assistance  with appropriate AE PRN within 7 day(s). 3.  Patient will perform toileting with moderate assistance  within 7 day(s). 4.  Patient will perform toilet transfers with moderate assistance  within 7 day(s). 5.  Patient will perform all aspects of toileting with moderate assistance  within 7 day(s). 6.  Patient will participate in upper extremity therapeutic exercise/activities with supervision/set-up for 8 minutes within 7 day(s). Outcome: Not Met   OCCUPATIONAL THERAPY EVALUATION  Patient: Nathan Ryan (13 y.o. female)  Date: 3/23/2023  Primary Diagnosis: Pubic ramus fracture (HCC) [S32.598A]       Precautions:  Fall, WBAT    ASSESSMENT  Based on the objective data described below, the patient presents with high level of pain, decreased strength (LLE>RLE)/coordination/AROM, and generally decreased tolerance for activity in setting of admission post GLF resulting in multiple pubic rami fx (non operatively being managed) and L 3rd rib fx. Patient with waxing/waning cognition on eval with intermittent decreased attention to task and STM/recall impairments requiring recurrent VC for redirection.  Requires Mod-MaxAx2 for all mobility today with increased pain with increased activity as expected. Requires assistance with LB dressing to don brief over toes, with assist bilaterally able to participate in pulling up brief with unilateral support on RW and assist. Patient with increasing fatigue on transfer to chair demonstrating increasingly flexed posture with continued mobility. Demo's shuffling lateral stepping with RW and 2 person Max assist to chair. Spoke with RN at end of session, recommend [facility] Rodríguez only at this point for OOB mobility to commode and chair. Recommend SNF at medically stable DC. Current Level of Function Impacting Discharge (ADLs/self-care): ModAx2 bed mobility, Max Ax3 bid>chair transfer, Max A LB dressing. Functional Outcome Measure: The patient scored 15/24 on the [facility] outcome measure which is indicative of increased likelihood for need for SNF/IPR at discharge . Other factors to consider for discharge: PLOF, decline from baseline, high pain     Patient will benefit from skilled therapy intervention to address the above noted impairments. PLAN :  Recommendations and Planned Interventions: self care training, functional mobility training, therapeutic exercise, balance training, therapeutic activities, endurance activities, patient education, and home safety training    Frequency/Duration: Patient will be followed by occupational therapy 5 times a week to address goals. Recommendation for discharge: (in order for the patient to meet his/her long term goals)  Therapy up to 5 days/week in SNF setting    This discharge recommendation:  Has been made in collaboration with the attending provider and/or case management    IF patient discharges home will need the following DME: walker: rolling       SUBJECTIVE:   Patient stated Lakisha Herrera was here he would be better at answering all of these questions.     OBJECTIVE DATA SUMMARY:   HISTORY:   Past Medical History:   Diagnosis Date    Adverse effect of anesthesia     per PCP patient had bladder retention after anesthesia with skin cancer surgery, under treatment with urologist. 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 vision in left eye    Ill-defined condition     osteporosis    Ill-defined condition     warts on hands    Psychiatric disorder     depression and anxiety     Past Surgical History:   Procedure Laterality Date    HX APPENDECTOMY      HX BREAST BIOPSY Bilateral     Age 21 - Benign    HX DILATION AND CURETTAGE      HX OTHER SURGICAL      multiple skin cancers     HX TONSILLECTOMY         Expanded or extensive additional review of patient history:     Home Situation  Home Environment: Apartment  # Steps to Enter: 0  One/Two Story Residence: One story  Living Alone: No  Support Systems: Spouse/Significant Other  Patient Expects to be Discharged to[de-identified] Unable to determine at this time  Current DME Used/Available at Home: [employer], rollator, Shower chair, Grab bars, Raised toilet seat  Tub or Shower Type: Shower    Hand dominance: Right    EXAMINATION OF PERFORMANCE DEFICITS:  Cognitive/Behavioral Status:  Neurologic State: Alert  Orientation Level: Oriented X4  Cognition: Appropriate decision making; Follows commands  Perception: Appears intact  Perseveration: No perseveration noted  Safety/Judgement: Decreased awareness of need for assistance;Decreased awareness of need for safety;Decreased insight into deficits; Awareness of environment    Skin: WNL    Edema: none    Hearing:   Auditory  Auditory Impairment: None    Vision/Perceptual:    Tracking: Able to track stimulus in all quadrants w/o difficulty                      Acuity: Within Defined Limits    Corrective Lenses: Reading glasses    Range of Motion:  AROM: Generally decreased, functional  PROM: Generally decreased, functional Strength:  Strength: Generally decreased, functional                Coordination:  Coordination: Within functional limits  Fine Motor Skills-Upper: Left Intact; Right Intact    Gross Motor Skills-Upper: Left Impaired;Right Intact    Tone & Sensation:  Tone: Normal  Sensation: Intact                      Balance:  Sitting: Impaired; Without support  Sitting - Static: Good (unsupported)  Sitting - Dynamic: Fair (occasional)  Standing: Impaired; With support  Standing - Static: Fair;Constant support  Standing - Dynamic : Poor;Constant support    Functional Mobility and Transfers for ADLs:  Bed Mobility:  Rolling: Minimum assistance;Assist x2  Supine to Sit: Moderate assistance;Assist x2  Scooting: Contact guard assistance    Transfers:  Sit to Stand: Moderate assistance;Assist x2  Stand to Sit: Maximum assistance;Assist x2  Bed to Chair: Maximum assistance;Assist x2    ADL Assessment:  Feeding: Setup    Oral Facial Hygiene/Grooming: Setup    Bathing: Moderate assistance         Upper Body Dressing: Minimum assistance    Lower Body Dressing: Maximum assistance    Toileting: Maximum assistance       ADL Intervention and task modifications:     Lower Body Dressing Assistance  Protective Undergarmet: Maximum assistance  Position Performed: Standing;Seated edge of bed  Cues: Don         Cognitive Retraining  Safety/Judgement: Decreased awareness of need for assistance;Decreased awareness of need for safety;Decreased insight into deficits; Awareness of environment    Functional Measure:  [facility] AM-PAC®      Daily Activity Inpatient Short Form (6-Clicks) Version 2  How much HELP from another person do you currently need. .. (If the patient hasn't done an activity recently, how much help from another person do you think they would need if they tried?) Total A Lot A Little None   1. Putting on and taking off regular lower body clothing? []   1 [x]   2 []   3 []   4   2. Bathing (including washing, rinsing, drying)? []   1 [x]   2 []   3 []   4   3. Toileting, which includes using toilet, bedpan, or urinal? []   1 [x]   2 []   3 []   4   4. Putting on and taking off regular upper body clothing? []   1 []   2 [x]   3 []   4   5. Taking care of personal grooming such as brushing teeth? []   1 []   2 [x]   3 []   4   6. Eating meals? []   1 []   2 []   3 [x]   4     Raw Score: 15/24                            Cutoff score ?191,2,3 had higher odds of discharging home with home health or need of SNF/IPR    1. Elodie Mortimer, Maranda Shiner Maurie Barter Namon Reveal. Validity of the AM-PAC 6-Clicks Inpatient Daily Activity and Basic Mobility Short Forms. Physical Therapy Mar 2014, [phone]) [phone]; DOI: 10.2522/ptj.47309434  2. Yojana Snyder. Association of AM-PAC \"6-Clicks\" Basic Mobility and Daily Activity Scores With Discharge Destination. Phys Ther. 2021 Apr 4;101(4):yayj689. doi: 10.1093/ptj/xsbh132. PMID: 42609033. V Kendall Higgins, Marbella D, Andreina Kang, Moe K, Dianna S. Activity Measure for Post-Acute Care \"6-Clicks\" Basic Mobility Scores Predict Discharge Destination After Acute Care Hospitalization in Select Patient Groups: A Retrospective, Observational Study. Arch Rehabil Res Clin Transl. 2022 Jul 16;4(3):933035. doi: 10.1016/j.arrct. 9695.762784. PMID: 27713019; PMCID: [id number]. 4. Pedro Vargas, Dhruv W, Karen BENITEZ. AM-PAC Short Forms Manual 4.0. Revised 2/2020.      Occupational Therapy Evaluation Charge Determination   History Examination Decision-Making   LOW Complexity : Brief history review  LOW Complexity : 1-3 performance deficits relating to physical, cognitive , or psychosocial skils that result in activity limitations and / or participation restrictions  LOW Complexity : No comorbidities that affect functional and no verbal or physical assistance needed to complete eval tasks       Based on the above components, the patient evaluation is determined to be of the following complexity level: LOW   Pain Rating:  C/o pain \"everywhere\" per patient report, premedicated for session per RN    Activity Tolerance:   Fair, Poor, and requires rest breaks    After treatment patient left in no apparent distress:    Sitting in chair, Call bell within reach, and Bed / chair alarm activated    COMMUNICATION/EDUCATION:   The patients plan of care was discussed with: Physical therapist, Registered nurse, and Case management. Home safety education was provided and the patient/caregiver indicated understanding., Patient/family have participated as able in goal setting and plan of care. , and Patient/family agree to work toward stated goals and plan of care. This patients plan of care is appropriate for delegation to TONI.     Thank you for this referral.  Rajeev Zamarripa OT  Time Calculation: 32 mins

## 2023-03-23 NOTE — PROGRESS NOTES
Problem: Falls - Risk of  Goal: *Absence of Falls  Description: Document Dallas Blu Fall Risk and appropriate interventions in the flowsheet. Outcome: Progressing Towards Goal  Note: Fall Risk Interventions:          Bed is locked in lowest position, call bell within reach, nonskid sock, frequent rounding.

## 2023-03-23 NOTE — PROGRESS NOTES
3/23/2023 12:37 PM Noted recommendation for SNF placement. Met with pt to discuss, SNF listing reviewed. Pt called her  on the phone and discussed choices with him. [facility] selected [facility] as preference with [facility] as back up, choice letter signed. CM sent referral to [facility] via [facility] and [facility] via [employer]. CM notified Liliana Plaza in admissions at [place] [id number] in admissions at [facility] of referral.  CM will follow. 3/23/2023 10:58 AM EMR reviewed, following for discharge needs. Spoke with treating PT, will follow for recommendations. JUDY Smiley     Care Management Progress Note      ICD-10-CM ICD-9-CM    1. Fall, initial encounter  [address] XXXA E888.9       2. Fracture of multiple pubic rami, left, closed, initial encounter ([facility] [address]  S32.592A 808.2           RUR:  13%  Risk Level: [x]Low []Moderate []High  Value-based purchasing: [] Yes [x] No  Bundle patient: [] Yes [x] No   Specify:     Transition of care plan:  Ongoing management, Ortho consulted  PT and OT rama completed  SNF placement at discharge, referrals sent to:  [facility]  Will need rapid COVID test prior to discharge   Outpatient follow-up.   TBD on transport

## 2023-03-23 NOTE — PROGRESS NOTES
Orthopedic NP Progress Note  Post Op day: * No surgery found *    March 23, 2023 10:39 AM     David Brownlee    Attending Physician: Treatment Team: Attending Provider: Barrington Polanco MD; Consulting Provider: SWETHA Reddy; Care Manager: Magalis Rush; Utilization Review: Sandra Chaves, NISHANT; Surgeon: Maria Eugenia Castaneda MD; Primary Nurse: Al Wilkinson, NISHANT; Occupational Therapist: Nicolasa Pena OT; Physical Therapist: Alex Smith PT     Vital Signs:    Patient Vitals for the past 8 hrs:   BP Temp Pulse Resp SpO2   03/23/23 0824 98/61 98.3 °F (36.8 °C) 88 16 91 %   03/23/23 0332 (!) 97/58 97.7 °F (36.5 °C) 87 16 90 %     BMI (calculated): 20.7 (03/22/23 1300)    Intake/Output:  No intake/output data recorded. 03/21 1901 - 03/23 0700  In: 1280.8 [P.O.:620; I.V.:660.8]  Out: 1800 [Urine:1800]    Pain Control:   Pain Assessment  Pain Scale 1: Numeric (0 - 10)  Pain Intensity 1: 3  Pain Onset 1: last night  Pain Location 1: Rib cage, Pelvis  Pain Orientation 1: Left  Pain Description 1: Aching  Pain Intervention(s) 1: Medication (see MAR)    LAB:    Recent Labs     03/22/23  0327   HCT 38.1   HGB 12.5     Lab Results   Component Value Date/Time    Sodium 135 (L) 03/22/2023 03:27 AM    Potassium 3.8 03/22/2023 03:27 AM    Chloride 105 03/22/2023 03:27 AM    CO2 25 03/22/2023 03:27 AM    Glucose 107 (H) 03/22/2023 03:27 AM    BUN 10 03/22/2023 03:27 AM    Creatinine 0.57 03/22/2023 03:27 AM    Calcium 7.9 (L) 03/22/2023 03:27 AM       Subjective:  David Brownlee is a 67 y.o. female with multiple pelvis fractures, reports just taking pain meds    . Tolerating diet. Objective: General: alert, cooperative, no distress. Neuro/Vascular: CNS Intact. Sensation stable. Brisk cap refill, + pulses UE/LE  Musculoskeletal:  +ROM UE/LE, limited by pain, NVI .     Skin:warm and dry      PT/OT:   Gait:                      Assessment:    s/p     Active Problems:    Pubic ramus fracture ([address] (3/21/2023) Plan:     - Multiple pelvic fractures, L nondisplaced L sided sup/inf pubic rami fracture with L sacral ala fracture - WBAT with therapy, I encouraged rené house with PT this afternoon. She will need to be premedicated prior to therapy. If worsening pain for the left side consider decreasing sitting to 30 minutes.   Repeat imaging of the pelvis to be done inpatient when she has ambulated to evaluate for displaced and widening of the PS.    - DVT ppx - primary team resumed home Eliquis   - Ortho will monitor for PT note and order XR once pt has been out of bed today    Discharge To:  TBD, pt has not been up with PT    Signed By: Silvia Harry NP    Orthopedic Nurse Practitioner

## 2023-03-23 NOTE — PROGRESS NOTES
Problem: Mobility Impaired (Adult and Pediatric)  Goal: *Acute Goals and Plan of Care (Insert Text)  Description: FUNCTIONAL STATUS PRIOR TO ADMISSION: Patient was modified independent using a rollator for functional mobility. HOME SUPPORT PRIOR TO ADMISSION: The patient lived with spouse but did not require assist.    Physical Therapy Goals  Initiated 3/23/2023  1. Patient will move from supine to sit and sit to supine , scoot up and down, and roll side to side in bed with independence within 7 day(s). 2.  Patient will transfer from bed to chair and chair to bed with minimal assistance/contact guard assist using the least restrictive device within 7 day(s). 3.  Patient will perform sit to stand with minimal assistance/contact guard assist within 7 day(s). 4.  Patient will ambulate with minimal assistance/contact guard assist for 25 feet with the least restrictive device within 7 day(s). Outcome: Not Met   PHYSICAL THERAPY EVALUATION  Patient: Blanca Renee (85 y.o. female)  Date: 3/23/2023  Primary Diagnosis: Pubic ramus [facility] ([facility] [S32.599A]       Precautions:   Fall      ASSESSMENT  Based on the objective data described below, the patient presents with significantly decreased LLE strength, impaired AROM, balance, activity tolerance, poor step alberto during ambulation (R > LLE) and overall significantly reduced functional mobility from her [place] baseline in the setting of hospital admission after GLF with + L superior and inferior pubic rami fracture with L sacral ala fracture and L 3rd rib fracture. PMH notable for multiple spine surgeries, and per chart, L rotator cuff injury. She is A&Ox4 however with some confusion and fluctuating participation during session. She is initially hesitant to participate with therapy, however once sitting edge of bed with modAx2 becomes motivated to stand up and sit in recliner chair.  She requires modA x2 for sit >stand transfer with RW and increased assistance, maxAx2 for short distance ambulation to bedside chair. Patient is unable to clear her steps for gait today, and rather shuffles her feet laterally to the chair. Increased thoracic kyphosis with fatigue. Patient does not articulate where her pain is but states that she is in pain several times. Orthos soft but stable as below with blood pressure recovering to 107/65 after 1 minute seated rest. Recommend maria l-steady lift for assistance with nursing transfers at this time. Patient will require SNF level rehab upon d/c. Vitals:    03/23/23 0332 03/23/23 0824 03/23/23 1111 03/23/23 1120   BP: (!) 97/58 98/61 99/69 (!) 89/65       107/65 retake   BP 1 Location:  Left upper arm Left upper arm Left upper arm   BP Patient Position:  Semi fowlers Sitting Sitting  Comment: post transfer   Pulse: 87 88 98 92   SpO2: 90% 91% 94% 92%            Current Level of Function Impacting Discharge (mobility/balance): maxAx2 bed to chair transfer     Functional Outcome Measure: The patient scored Total Score: 2/28 on the Tinetti outcome measure which is indicative of high fall risk. Other factors to consider for discharge: spoke with patient regarding SNF recommendation; she expressed preference to d/c home with her  support, however this would not be a safe option with her current functional level. Patient will benefit from skilled therapy intervention to address the above noted impairments. PLAN :  Recommendations and Planned Interventions: bed mobility training, transfer training, gait training, therapeutic exercises, patient and family training/education, and therapeutic activities      Frequency/Duration: Patient will be followed by physical therapy:  5 times a week to address goals.     Recommendation for discharge: (in order for the patient to meet his/her long term goals)  Therapy up to 5 days/week in SNF setting    This discharge recommendation:  Has been made in collaboration with the attending provider and/or case management    IF patient discharges home will need the following DME: mechanical lift and wheelchair         SUBJECTIVE:   Patient stated I need to stand up to get these on. re: patient's brief    OBJECTIVE DATA SUMMARY:   Patient received supine in bed and was agreeable to participate in PT session. Patient was cleared by nursing to participate in PT session. HISTORY:    Past Medical History:   Diagnosis Date    Adverse effect of anesthesia     per PCP patient had bladder retention after anesthesia with skin cancer surgery, under treatment with urologist. 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    Ill-defined condition     osteporosis    Ill-defined condition     warts on hands    Psychiatric disorder     depression and anxiety     Past Surgical History:   Procedure Laterality Date    HX APPENDECTOMY      HX BREAST BIOPSY Bilateral     Age 21 - Benign    HX DILATION AND CURETTAGE      HX OTHER SURGICAL      multiple skin cancers     HX TONSILLECTOMY         Personal factors and/or comorbidities impacting plan of care: none additional    Home Situation  Home Environment: Apartment  # Steps to Enter: 0  One/Two Story Residence: One story  Living Alone: No  Support Systems: Spouse/Significant Other  Patient Expects to be Discharged to[de-identified] Unable to determine at this time  Current DME Used/Available at Home: Darnella Belch, rollator, Shower chair, Grab bars, Raised toilet seat  Tub or Shower Type: Shower    EXAMINATION/PRESENTATION/DECISION MAKING:   Critical Behavior:  Neurologic State: Alert  Orientation Level: Oriented X4  Cognition: Appropriate decision making, Follows commands     Hearing:   Auditory  Auditory Impairment: None  Skin:  bruising to L upper arm, + moy catheter  Edema: none apparent   Range Of Motion:  AROM: Generally decreased, functional                       Strength:    Strength: Generally decreased, functional                    Tone & Sensation:   Tone: Normal                              Coordination:  Coordination: Within functional limits  Vision:      Functional Mobility:  Bed Mobility:  Rolling: Minimum assistance;Assist x2  Supine to Sit: Moderate assistance;Assist x2     Scooting: Contact guard assistance  Transfers:  Sit to Stand: Moderate assistance;Assist x2  Stand to Sit: Maximum assistance;Assist x2        Bed to Chair: Maximum assistance;Assist x2              Balance:   Sitting: Impaired; Without support  Sitting - Static: Good (unsupported)  Sitting - Dynamic: Fair (occasional)  Standing: Impaired; With support  Standing - Static: Fair;Constant support  Standing - Dynamic : Poor;Constant support  Ambulation/Gait Training:  Distance (ft): 3 Feet (ft)  Assistive Device: Gait belt;Walker, rolling  Ambulation - Level of Assistance: Maximum assistance;Assist x2        Gait Abnormalities: Decreased step clearance (not able to clear steps)     Left Side Weight Bearing: As tolerated        Speed/Marta: Pace decreased (<100 feet/min)  Step Length: Right shortened;Left shortened                     Stairs:               Therapeutic Exercises:       Functional Measure:  Tinetti test:    Sitting Balance: 1  Arises: 0  Attempts to Rise: 0  Immediate Standing Balance: 0  Standing Balance: 0  Nudged: 0  Eyes Closed: 0  Turn 360 Degrees - Continuous/Discontinuous: 0  Turn 360 Degrees - Steady/Unsteady: 0  Sitting Down: 0  Balance Score: 1 Balance total score  Indication of Gait: 1  R Step Length/Height: 0  L Step Length/Height: 0  R Foot Clearance: 0  L Foot Clearance: 0  Step Symmetry: 0  Step Continuity: 0  Path: 0  Trunk: 0  Walking Time: 0  Gait Score: 1 Gait total score  Total Score: 2/28 Overall total score         Tinetti Tool Score Risk of Falls  <19 = High Fall Risk  19-24 = Moderate Fall Risk  25-28 = Low Fall Risk  Andre SORIANO. Performance-Oriented Assessment of Mobility Problems in Elderly Patients. [facility]; [id number]. (Scoring Description: PT Bulletin Feb. 10, 1993)    Older adults: Timothy Abarca et al, 2009; n = 1000 [facility] elderly evaluated with ABC, AIDE, ADL, and IADL)  · Mean AIDE score for males aged 69-68 years = 26.21(3.40)  · Mean AIDE score for females age 69-68 years = 25.16(4.30)  · Mean AIDE score for males over 80 years = 23.29(6.02)  · Mean AIDE score for females over 80 years = 17.20(8.32)        Physical Therapy Evaluation Charge Determination   History Examination Presentation Decision-Making   MEDIUM  Complexity : 1-2 comorbidities / personal factors will impact the outcome/ POC  MEDIUM Complexity : 3 Standardized tests and measures addressing body structure, function, activity limitation and / or participation in recreation  MEDIUM Complexity : Evolving with changing characteristics  MEDIUM Complexity : FOTO score of 26-74      Based on the above components, the patient evaluation is determined to be of the following complexity level: MEDIUM    Pain Rating:  Patient complains of pain with mobility but does not articulate where her pain is located despite multiple times questioned. Activity Tolerance:   Good and tolerates ADLs without rest breaks      After treatment patient left in no apparent distress:   Sitting in chair, Call bell within reach, and Bed / chair alarm activated    COMMUNICATION/EDUCATION:   The patients plan of care was discussed with: Occupational therapist, Registered nurse, and Case management. Fall prevention education was provided and the patient/caregiver indicated understanding. and Patient/family have participated as able in goal setting and plan of care.     Thank you for this referral.  Mathew Dancer PT, DPT   Time Calculation: 35 mins

## 2023-03-24 ENCOUNTER — APPOINTMENT (OUTPATIENT)
Dept: GENERAL RADIOLOGY | Age: 73
End: 2023-03-24
Attending: FAMILY MEDICINE
Payer: MEDICARE

## 2023-03-24 ENCOUNTER — APPOINTMENT (OUTPATIENT)
Dept: GENERAL RADIOLOGY | Age: 73
End: 2023-03-24
Attending: NURSE PRACTITIONER
Payer: MEDICARE

## 2023-03-24 VITALS
RESPIRATION RATE: 17 BRPM | SYSTOLIC BLOOD PRESSURE: 108 MMHG | HEART RATE: 87 BPM | DIASTOLIC BLOOD PRESSURE: 68 MMHG | OXYGEN SATURATION: 94 % | BODY MASS INDEX: 20.6 KG/M2 | HEIGHT: 66 IN | WEIGHT: 128.2 LBS | TEMPERATURE: 98.1 F

## 2023-03-24 LAB
ANION GAP SERPL CALC-SCNC: 5 MMOL/L (ref 5–15)
BASOPHILS # BLD: 0 K/UL (ref 0–0.1)
BASOPHILS NFR BLD: 0 % (ref 0–1)
BUN SERPL-MCNC: 8 MG/DL (ref 6–20)
BUN/CREAT SERPL: 17 (ref 12–20)
CALCIUM SERPL-MCNC: 7.9 MG/DL (ref 8.5–10.1)
CHLORIDE SERPL-SCNC: 107 MMOL/L (ref 97–108)
CO2 SERPL-SCNC: 23 MMOL/L (ref 21–32)
CREAT SERPL-MCNC: 0.46 MG/DL (ref 0.55–1.02)
DIFFERENTIAL METHOD BLD: ABNORMAL
EOSINOPHIL # BLD: 0.1 K/UL (ref 0–0.4)
EOSINOPHIL NFR BLD: 2 % (ref 0–7)
ERYTHROCYTE [DISTWIDTH] IN BLOOD BY AUTOMATED COUNT: 13.2 % (ref 11.5–14.5)
GLUCOSE SERPL-MCNC: 88 MG/DL (ref 65–100)
HCT VFR BLD AUTO: 34 % (ref 35–47)
HGB BLD-MCNC: 11.3 G/DL (ref 11.5–16)
IMM GRANULOCYTES # BLD AUTO: 0 K/UL (ref 0–0.04)
IMM GRANULOCYTES NFR BLD AUTO: 1 % (ref 0–0.5)
LYMPHOCYTES # BLD: 1.1 K/UL (ref 0.8–3.5)
LYMPHOCYTES NFR BLD: 14 % (ref 12–49)
MCH RBC QN AUTO: 34 PG (ref 26–34)
MCHC RBC AUTO-ENTMCNC: 33.2 G/DL (ref 30–36.5)
MCV RBC AUTO: 102.4 FL (ref 80–99)
MONOCYTES # BLD: 0.6 K/UL (ref 0–1)
MONOCYTES NFR BLD: 8 % (ref 5–13)
NEUTS SEG # BLD: 5.8 K/UL (ref 1.8–8)
NEUTS SEG NFR BLD: 75 % (ref 32–75)
NRBC # BLD: 0 K/UL (ref 0–0.01)
NRBC BLD-RTO: 0 PER 100 WBC
PLATELET # BLD AUTO: 144 K/UL (ref 150–400)
PMV BLD AUTO: 10.1 FL (ref 8.9–12.9)
POTASSIUM SERPL-SCNC: 3.2 MMOL/L (ref 3.5–5.1)
RBC # BLD AUTO: 3.32 M/UL (ref 3.8–5.2)
SARS-COV-2 RDRP RESP QL NAA+PROBE: NOT DETECTED
SODIUM SERPL-SCNC: 135 MMOL/L (ref 136–145)
SOURCE, COVRS: NORMAL
WBC # BLD AUTO: 7.7 K/UL (ref 3.6–11)

## 2023-03-24 PROCEDURE — 85025 COMPLETE CBC W/AUTO DIFF WBC: CPT

## 2023-03-24 PROCEDURE — 87635 SARS-COV-2 COVID-19 AMP PRB: CPT

## 2023-03-24 PROCEDURE — 74011000250 HC RX REV CODE- 250: Performed by: INTERNAL MEDICINE

## 2023-03-24 PROCEDURE — 72170 X-RAY EXAM OF PELVIS: CPT

## 2023-03-24 PROCEDURE — 2709999900 HC NON-CHARGEABLE SUPPLY

## 2023-03-24 PROCEDURE — 71045 X-RAY EXAM CHEST 1 VIEW: CPT

## 2023-03-24 PROCEDURE — 74011250637 HC RX REV CODE- 250/637: Performed by: INTERNAL MEDICINE

## 2023-03-24 PROCEDURE — 97530 THERAPEUTIC ACTIVITIES: CPT

## 2023-03-24 PROCEDURE — 36415 COLL VENOUS BLD VENIPUNCTURE: CPT

## 2023-03-24 PROCEDURE — 97110 THERAPEUTIC EXERCISES: CPT

## 2023-03-24 PROCEDURE — 74011250637 HC RX REV CODE- 250/637: Performed by: FAMILY MEDICINE

## 2023-03-24 PROCEDURE — 80048 BASIC METABOLIC PNL TOTAL CA: CPT

## 2023-03-24 RX ORDER — OXYCODONE HYDROCHLORIDE 5 MG/1
5 TABLET ORAL
Qty: 20 TABLET | Refills: 0 | Status: SHIPPED | OUTPATIENT
Start: 2023-03-24 | End: 2023-03-27

## 2023-03-24 RX ORDER — POTASSIUM CHLORIDE 750 MG/1
40 TABLET, FILM COATED, EXTENDED RELEASE ORAL
Status: COMPLETED | OUTPATIENT
Start: 2023-03-24 | End: 2023-03-24

## 2023-03-24 RX ADMIN — DOCUSATE SODIUM 100 MG: 100 CAPSULE, LIQUID FILLED ORAL at 08:51

## 2023-03-24 RX ADMIN — ACYCLOVIR 200 MG: 200 CAPSULE ORAL at 08:51

## 2023-03-24 RX ADMIN — OXYCODONE HYDROCHLORIDE 5 MG: 5 TABLET ORAL at 16:43

## 2023-03-24 RX ADMIN — SODIUM CHLORIDE, PRESERVATIVE FREE 10 ML: 5 INJECTION INTRAVENOUS at 06:59

## 2023-03-24 RX ADMIN — ACETAMINOPHEN 650 MG: 325 TABLET ORAL at 12:41

## 2023-03-24 RX ADMIN — OXYCODONE HYDROCHLORIDE 5 MG: 5 TABLET ORAL at 12:41

## 2023-03-24 RX ADMIN — POTASSIUM CHLORIDE 40 MEQ: 750 TABLET, FILM COATED, EXTENDED RELEASE ORAL at 08:51

## 2023-03-24 RX ADMIN — GABAPENTIN 300 MG: 300 CAPSULE ORAL at 16:42

## 2023-03-24 RX ADMIN — APIXABAN 5 MG: 5 TABLET, FILM COATED ORAL at 08:51

## 2023-03-24 RX ADMIN — OXYCODONE HYDROCHLORIDE 5 MG: 5 TABLET ORAL at 08:51

## 2023-03-24 RX ADMIN — ACETAMINOPHEN 650 MG: 325 TABLET ORAL at 06:59

## 2023-03-24 RX ADMIN — ACETAMINOPHEN 650 MG: 325 TABLET ORAL at 00:00

## 2023-03-24 RX ADMIN — PANTOPRAZOLE SODIUM 40 MG: 40 TABLET, DELAYED RELEASE ORAL at 08:51

## 2023-03-24 RX ADMIN — GABAPENTIN 300 MG: 300 CAPSULE ORAL at 08:51

## 2023-03-24 RX ADMIN — BUSPIRONE HYDROCHLORIDE 30 MG: 15 TABLET ORAL at 08:51

## 2023-03-24 NOTE — PROGRESS NOTES
Problem: Self Care Deficits Care Plan (Adult)  Goal: *Acute Goals and Plan of Care (Insert Text)  Description: FUNCTIONAL STATUS PRIOR TO ADMISSION: Patient was modified independent using a rollator for functional mobility. Patient required minimal assistance for basic and instrumental ADLs reports  was doing most of cooking/cleaning, housework, and occasionally helping her at setup level with ADLs. HOME SUPPORT: The patient lived with spouse and required minimal assistance/contact guard assist for ADLs and IADLs. Occupational Therapy Goals  Initiated 3/23/2023  1. Patient will perform grooming sitting EOB for 3 minutes without LOB supervision/set-up within 7 day(s). 2.  Patient will perform lower body dressing with moderate assistance  with appropriate AE PRN within 7 day(s). 3.  Patient will perform toileting with moderate assistance  within 7 day(s). 4.  Patient will perform toilet transfers with moderate assistance  within 7 day(s). 5.  Patient will perform all aspects of toileting with moderate assistance  within 7 day(s). 6.  Patient will participate in upper extremity therapeutic exercise/activities with supervision/set-up for 8 minutes within 7 day(s). Outcome: Progressing Towards Goal   OCCUPATIONAL THERAPY TREATMENT  Patient: Katina Taylor (31 y.o. female)  Date: 3/24/2023  Diagnosis: Pubic ramus fracture ([address] Herman Emperor <principal problem not specified>      Precautions: Fall, WBAT  Chart, occupational therapy assessment, plan of care, and goals were reviewed. ASSESSMENT  Patient continues with skilled OT services and is slowly progressing towards goals. Pt engaged with UE exercises bed level She needs max assist for LB ADL's and toileting as well as for ADL related mobility . Min assist for UB dress and mod assist for bathing UB. Pt set-up for lunch with spouse present.      Current Level of Function Impacting Discharge (ADLs): max assist LB dress and toileting, mod assist bathe, min assist UB dress    Other factors to consider for discharge:          PLAN :  Patient continues to benefit from skilled intervention to address the above impairments. Continue treatment per established plan of care to address goals. Recommend with staff: ADL's, there ex, there act    Recommend next OT session: cont towards goals    Recommendation for discharge: (in order for the patient to meet his/her long term goals)  Therapy up to 5 days/week in SNF setting    This discharge recommendation:  Has not yet been discussed the attending provider and/or case management    IF patient discharges home will need the following DME:        SUBJECTIVE:   Patient stated I am leaving at 2 today.     OBJECTIVE DATA SUMMARY:   Cognitive/Behavioral Status:  Neurologic State: Alert  Orientation Level: Oriented X4  Cognition: Appropriate decision making; Appropriate for age attention/concentration; Appropriate safety awareness; Follows commands             Functional Mobility and Transfers for ADLs:  Bed Mobility:  Rolling: Maximum assistance;Assist x1;Additional time  Supine to Sit: Maximum assistance;Assist x1;Additional time (unable to complete)  Scooting: Maximum assistance;Assist x1    Transfers:   Not attempted           Balance:  Sitting: Impaired; With support    ADL Intervention:            Upper Body Bathing  Bathing Assistance:  Moderate assistance    Type of Bath: Full;Bath Pack    Lower Body Bathing  Bathing Assistance: Maximum assistance    Upper Body Dressing Assistance  Dressing Assistance: Minimum assistance    Lower Body Dressing Assistance  Dressing Assistance: Maximum assistance    Toileting  Toileting Assistance: Maximum assistance         Therapeutic Exercises:     EXERCISE   Sets   Reps   Active Active Assist   Passive   Comments   Shoulder flex/ext 1 10 [x]           []           []              Chest presses 1 10 [x]           []           []              Forearm supination/pronation 1 10 [x] []           []              Finger flex/ext 1 10 [x]           []           []              Wrist flex/ext 1 10 [x]           []           []                 []           []           []                 []           []           []                 []           []           []                 []           []           []                 []           []           []                 []           []           []                   Activity Tolerance:   Fair    After treatment patient left in no apparent distress:   Supine in bed and Call bell within reach    COMMUNICATION/COLLABORATION:   The patients plan of care was discussed with: Physical therapist and Occupational therapist.     THANG Sims  Time Calculation: 16 mins

## 2023-03-24 NOTE — PROGRESS NOTES
Vascular Access Team:  Extended dwell Arrow endurance ultrasound guided IV placed in the left basilic . 6 cm, by Renny Brizuela. Line has brisk blood return. Secured with stat lock, dressed with large tegaderm, alcohol cap utilized. Patient tolerated well. Primary RN Jennifer Jackson made aware line is ready for use.       Prema Krueger RN

## 2023-03-24 NOTE — PROGRESS NOTES
Problem: Falls - Risk of  Goal: *Absence of Falls  Description: Document Ariel Comment Fall Risk and appropriate interventions in the flowsheet. Outcome: Resolved/Met  Note: Fall Risk Interventions:  Mobility Interventions: Patient to call before getting OOB         Medication Interventions: Bed/chair exit alarm, Evaluate medications/consider consulting pharmacy, Patient to call before getting OOB    Elimination Interventions: Bed/chair exit alarm, Call light in reach    History of Falls Interventions: Bed/chair exit alarm         Problem: Patient Education: Go to Patient Education Activity  Goal: Patient/Family Education  Outcome: Resolved/Met     Problem: Pressure Injury - Risk of  Goal: *Prevention of pressure injury  Description: Document Cortez Scale and appropriate interventions in the flowsheet.   Outcome: Resolved/Met  Note: Pressure Injury Interventions:  Sensory Interventions: Assess changes in LOC    Moisture Interventions: Absorbent underpads    Activity Interventions: PT/OT evaluation    Mobility Interventions: HOB 30 degrees or less, PT/OT evaluation    Nutrition Interventions: Document food/fluid/supplement intake    Friction and Shear Interventions: HOB 30 degrees or less                Problem: Patient Education: Go to Patient Education Activity  Goal: Patient/Family Education  Outcome: Resolved/Met     Problem: Pain  Goal: *Control of Pain  Outcome: Resolved/Met  Goal: *PALLIATIVE CARE:  Alleviation of Pain  Outcome: Resolved/Met     Problem: Patient Education: Go to Patient Education Activity  Goal: Patient/Family Education  Outcome: Resolved/Met     Problem: Nutrition Deficit  Goal: *Optimize nutritional status  Outcome: Resolved/Met     Problem: Patient Education: Go to Patient Education Activity  Goal: Patient/Family Education  Outcome: Resolved/Met     Problem: Patient Education: Go to Patient Education Activity  Goal: Patient/Family Education  Outcome: Resolved/Met

## 2023-03-24 NOTE — PROGRESS NOTES
Bedside shift change report given to Celeste Grimaldo RN (oncoming nurse) by Kenny Villarreal RN (offgoing nurse). Report included the following information SBAR, Kardex, Intake/Output, MAR, and Recent Results. The opportunity for questions was presented.

## 2023-03-24 NOTE — PROGRESS NOTES
Spiritual Care Partner Volunteer visited patient at [address] in [address] on 3/24/2023  Documented by:  Brittny Palencia, [address], [facility]  Staff [address]  Paging service: [phone] (ESDRAS)

## 2023-03-24 NOTE — PROGRESS NOTES
Medicare pt has received, reviewed, and signed 2nd IM letter informing them of their right to appeal the discharge. Signed copy has been placed on pt bedside chart.   Jolie West CMS

## 2023-03-24 NOTE — DISCHARGE SUMMARY
[facility]  [address] [facility], [address] [place] [address]  [phone]    [address] Adult  Hospitalist Group     Discharge Summary       PATIENT ID: Adrianna Anaya  MRN: [id number]   YOB: 1950    DATE OF ADMISSION: 3/21/2023 10:54 AM    DATE OF DISCHARGE: 03/24/23   PRIMARY CARE PROVIDER: Sarika Srivastava MD     DISCHARGING PROVIDER: Phil Osuna MD      CONSULTATIONS: IP CONSULT TO ORTHOPEDIC SURGERY    PROCEDURES/SURGERIES: * No surgery found *    ADMITTING [address] [place] [address] COURSE:     Left sacral ala and left superior and inferior pubic rami fractures  -Continue scheduled Tylenol and oxycodone as needed. -Ortho evaluated, plan for conservative management for now  -PT/OT     Fall  -Continue PT/OT  -Will need SNF on discharge     History of PE  -Resume Eliquis     History of alcohol abuse  -Hold home naltrexone for now to ensure adequate pain control     Anxiety and depression  -Continue BuSpar, trazodone, Topamax, gabapentin     Hyperlipidemia  -Continue Crestor     GERD  -Continue PPI        PENDING TEST RESULTS:   At the time of discharge the following test results are still pending: None    FOLLOW UP APPOINTMENTS:    Follow-up Information       Follow up With Specialties Details Why Contact Info    Mike Colmenares MD Orthopedic Surgery Follow up in 2 week(s)  [address]  [phone]                 DIET: Regular    ACTIVITY: As tolerated      DISCHARGE MEDICATIONS:  Current Discharge Medication List        START taking these medications    Details   oxyCODONE IR (ROXICODONE) 5 mg immediate release tablet Take 1 Tablet by mouth every four (4) hours as needed for Pain for up to 3 days.  Max Daily Amount: 30 mg.  Qty: 20 Tablet, Refills: 0  Start date: 3/24/2023, End date: 3/27/2023    Associated Diagnoses: Fall, initial encounter           CONTINUE these medications which have NOT CHANGED Details   busPIRone (BUSPAR) 30 mg tablet Take 30 mg by mouth two (2) times a day. apixaban (Eliquis) 5 mg tablet Take 5 mg by mouth two (2) times a day. acyclovir (ZOVIRAX) 200 mg capsule Take 200 mg by mouth daily. naltrexone (DEPADE) 50 mg tablet Take 50 mg by mouth daily. docusate sodium (COLACE) 100 mg capsule Take 100 mg by mouth as needed. topiramate (TOPAMAX) 100 mg tablet Take 1 tablet by mouth nightly  Qty: 30 Tablet, Refills: 3    Associated Diagnoses: Essential tremor      rosuvastatin (CRESTOR) 10 mg tablet Take 10 mg by mouth nightly.      gabapentin (NEURONTIN) 300 mg capsule TAKE 1 CAPSULE BY MOUTH THREE TIMES DAILY      omeprazole (PRILOSEC) 40 mg capsule Take 40 mg by mouth daily. traZODone (DESYREL) 100 mg tablet Take 300 mg by mouth nightly. Indications: insomnia      latanoprost (XALATAN) 0.005 % ophthalmic solution Administer 1 Drop to both eyes nightly. denosumab (PROLIA) 60 mg/mL injection 60 mg by SubCUTAneous route every 6 months. Indications: POST-MENOPAUSAL OSTEOPOROSIS           STOP taking these medications       cycloSPORINE (RESTASIS) 0.05 % dpet Comments:   Reason for Stopping:         cholecalciferol (VITAMIN D3) 25 mcg (1,000 unit) cap Comments:   Reason for Stopping:         Bifidobacterium Infantis (Align) 4 mg cap Comments:   Reason for Stopping:         coenzyme q10 10 mg cap Comments:   Reason for Stopping:         ginkgo biloba leaf extract 60 mg tab Comments:   Reason for Stopping:         lisinopril (PRINIVIL, ZESTRIL) 10 mg tablet Comments:   Reason for Stopping:         peg 400-propylene glycol (SYSTANE) 0.4-0.3 % drop Comments:   Reason for Stopping:                 NOTIFY YOUR PHYSICIAN FOR ANY OF THE FOLLOWING:   Fever over 101 degrees for 24 hours. Chest pain, shortness of breath, fever, chills, nausea, vomiting, diarrhea, change in mentation, falling, weakness, bleeding. Severe pain or pain not relieved by medications.   Or, any other signs or symptoms that you may have questions about.     DISPOSITION:    Home With:   OT  PT  HH  RN      x Long term SNF/Inpatient Rehab    Independent/assisted living    Hospice    Other:         PHYSICAL EXAMINATION AT DISCHARGE:    Gen: Elderly, in no acute distress  HEENT:  Pink conjunctivae, PERRL, hearing intact to voice, moist mucous membranes  Neck:  Supple, no tenderness to palpation   Resp:  No accessory muscle use, clear breath sounds without wheezes rales or rhonchi  Card:  RRR, No murmurs, normal S1, S2, no peripheral edema  Abd:  Soft, non-tender, non-distended, normoactive bowel sounds are present  Musc: Limited range of motion of left side  Skin:  No rashes or ulcers, skin turgor is good  Neuro:  Cranial nerves 3-12 are grossly intact, follows commands appropriately  Psych:  Oriented to person, place, and time, Alert with good insight      CHRONIC MEDICAL DIAGNOSES:  Problem List as of 3/24/2023 Date Reviewed: 5/4/2021            Codes Class Noted - Resolved    Pubic ramus fracture ([facility] ICD-10-CM: X75.322G  ICD-9-CM: 808.2  3/21/2023 - Present           Greater than 30 minutes were spent with the patient on counseling and coordination of care    Signed:   Radha Rodriguez MD  3/24/2023  12:53 PM

## 2023-03-24 NOTE — PROGRESS NOTES
Xray of the pelvis shows no change in the left superior and inferior pubic rami fractures. No plan for surgical intervention. Continue WBAT LLE. Followup outpatient with Dr. Gaby Spears.     SWETHA Garcia-C  Orthopaedic Surgery [address]

## 2023-03-24 NOTE — ROUTINE PROCESS
TRANSFER - OUT REPORT:    Verbal report given to [facility] AURELIANO RN(name) on Adrianna Anaya  being transferred to [place](unit) for routine progression of care       Report consisted of patients Situation, Background, Assessment and   Recommendations(SBAR). Information from the following report(s) SBAR and Kardex was reviewed with the receiving nurse. Lines:   Extended Dwell Peripheral IV (Active)   Criteria for Appropriate Use Limited/no vessel suitable for conventional peripheral access 03/24/23 1009   Site Assessment Clean, dry, & intact 03/24/23 1009   Phlebitis Assessment 0 03/24/23 1009   Infiltration Assessment 0 03/24/23 1009   Line Status Brisk blood return 03/24/23 1009   Alcohol Cap Used Yes 03/24/23 1009        Opportunity for questions and clarification was provided.       Patient transported with:   [employer]

## 2023-03-24 NOTE — PROGRESS NOTES
3/24/2023 10:36 AM Transition of Care Plan to SNF/Rehab    SNF/Rehab Transition:  Patient has been accepted to [facility] and meets criteria for admission. Patient will transported by [facility] and expected to leave at [facility] requesting chest xray for pt, CM relayed to pt's Attending. Communication to Patient/Family:  Met with patient and called and spoke with pt's  and they are agreeable to the transition plan. Communication to SNF/Rehab:  Bedside RN, Ina Newberry, has been notified to update the transition plan to the facility and call report [phone]. Discharge information has been updated on the AVS.     Discharge instructions to be fax'd to facility at via [employer]. Nursing Please include all hard scripts for controlled substances, med rec and dc summary, and AVS in packet. Reviewed and confirmed with facility, [facility], can manage the patient care needs for the following:     Melania Jain with (X) only those applicable:    Medication:  [x]  Medications will be available at the facility  []  IV Antibiotics  [x]  Controlled Substance - hard copy to be sent with patient   []  Weekly Labs   Documents:  [x] Hard RX  [x] MAR  [] Kardex  [x] AVS  []Transfer Summary  [x]Discharge   Equipment:  []  CPAP/BiPAP  []  Wound Vacuum  []  Peck or Urinary Device  []  PICC/Central Line  []  Nebulizer  []  Ventilator   Treatment:  []Isolation (for MRSA, VRE, etc.)  []Surgical Drain Management  []Tracheostomy Care  []Dressing Changes  []Dialysis with transportation and chair time. []PEG Care  []Oxygen  []Daily Weights for Heart Failure   Dietary:  []Any diet limitations  []Tube Feedings   []Total Parenteral Management (TPN)   Eligible for Medicaid Long Term Services and Supports  Yes:  [] Eligible for medical assistance or will become eligible within 180 days and UAI completed. [] Provider/Patient and/or support system has requested screening.   [] UAI copy provided to patient or responsible party. [] UAI unavailable at discharge will send once processed to SNF provider. [] UAI unavailable at discharged mailed to patient  No:   [x] Private pay and is not financially eligible for Medicaid within the next 180 days. [] Reside out-of-state. [] A residents of a state owned/operated facility that is licensed  by [address] [employer] or [facility]  [] Enrollment in [facility] hospice services  [] [facility]  [] Patient /Family declines to have screening completed or provide financial information for screening     Financial Resources:  Medicaid    [] Initiated and application pending   [] Full coverage     Advanced Care Plan:  []Surrogate Decision Maker of Care  []POA  []Communicated Code Status Full   Other         3/24/2023 8:53 AM [facility] via [employer]. CM sent message to [facility] requesting to know admission decision. JUDY Stoner     Care Management Progress Note         ICD-10-CM ICD-9-CM     1. Fall, initial encounter  [address] XXXA E888.9         2. Fracture of multiple pubic rami, left, closed, initial encounter ([facility] [address] [facility]  S32.592A 808.2               RUR:  13%  Risk Level: [x]Low []Moderate []High  Value-based purchasing: [] Yes [x] No  Bundle patient: [] Yes [x] No              Specify:      Transition of care plan:  Ongoing management, Ortho consulted  PT and OT evals completed  SNF placement at discharge, referrals sent to:  [facility] accepted   [facility]- accepted  Will need rapid COVID test prior to discharge   Outpatient follow-up. [facility] scheduled for 2PM  Care Management Interventions  PCP Verified by CM: Yes (Suzan Gottlieb MD )  Palliative Care Criteria Met (RRAT>21 & CHF Dx)?: No  Mode of Transport at Discharge:  Other (see comment) (TBD)  Physical Therapy Consult: Yes  Occupational Therapy Consult: Yes  Support Systems: Spouse/Significant Other  Confirm Follow Up Transport: Family  The Plan for Transition of Care is Related to the Following Treatment Goals : SNF  The Patient and/or Patient Representative was Provided with a Choice of Provider and Agrees with the Discharge Plan?: Yes  Freedom of Choice List was Provided with Basic Dialogue that Supports the Patient's Individualized Plan of Care/Goals, Treatment Preferences and Shares the Quality Data Associated with the Providers?: Yes  Discharge Location  Patient Expects to be Discharged to[de-identified] Skilled nursing facility

## 2023-03-24 NOTE — PROGRESS NOTES
Problem: Falls - Risk of  Goal: *Absence of Falls  Description: Document Catarina Moody Fall Risk and appropriate interventions in the flowsheet. Outcome: Progressing Towards Goal  Note: Fall Risk Interventions:                                Problem: Patient Education: Go to Patient Education Activity  Goal: Patient/Family Education  Outcome: Progressing Towards Goal     Problem: Pressure Injury - Risk of  Goal: *Prevention of pressure injury  Description: Document Cortez Scale and appropriate interventions in the flowsheet. Outcome: Progressing Towards Goal  Note: Pressure Injury Interventions:             Activity Interventions: Assess need for specialty bed, Increase time out of bed, PT/OT evaluation    Mobility Interventions: Assess need for specialty bed, HOB 30 degrees or less, PT/OT evaluation    Nutrition Interventions: Document food/fluid/supplement intake, Offer support with meals,snacks and hydration    Friction and Shear Interventions: Apply protective barrier, creams and emollients, Minimize layers, Lift sheet                Problem: Patient Education: Go to Patient Education Activity  Goal: Patient/Family Education  Outcome: Progressing Towards Goal     Problem: Pain  Goal: *Control of Pain  Outcome: Progressing Towards Goal     Problem: Patient Education: Go to Patient Education Activity  Goal: Patient/Family Education  Outcome: Progressing Towards Goal     Problem: Nutrition Deficit  Goal: *Optimize nutritional status  Outcome: Progressing Towards Goal     Problem: Patient Education: Go to Patient Education Activity  Goal: Patient/Family Education  Outcome: Progressing Towards Goal     Problem: Patient Education: Go to Patient Education Activity  Goal: Patient/Family Education  Outcome: Progressing Towards Goal

## 2023-03-24 NOTE — PROGRESS NOTES
Problem: Mobility Impaired (Adult and Pediatric)  Goal: *Acute Goals and Plan of Care (Insert Text)  Description: FUNCTIONAL STATUS PRIOR TO ADMISSION: Patient was modified independent using a rollator for functional mobility. HOME SUPPORT PRIOR TO ADMISSION: The patient lived with spouse but did not require assist.    Physical Therapy Goals  Initiated 3/23/2023  1. Patient will move from supine to sit and sit to supine , scoot up and down, and roll side to side in bed with independence within 7 day(s). 2.  Patient will transfer from bed to chair and chair to bed with minimal assistance/contact guard assist using the least restrictive device within 7 day(s). 3.  Patient will perform sit to stand with minimal assistance/contact guard assist within 7 day(s). 4.  Patient will ambulate with minimal assistance/contact guard assist for 25 feet with the least restrictive device within 7 day(s). Outcome: Progressing Towards Goal   PHYSICAL THERAPY TREATMENT  Patient: Dana Strauss (19 y.o. female)  Date: 3/24/2023  Diagnosis: Pubic ramus fracture ([facility] [S32.599A] <principal problem not specified>      Precautions: Fall, WBAT  Chart, physical therapy assessment, plan of care and goals were reviewed. ASSESSMENT  Patient continues with skilled PT services and is progressing towards goals. Patient this morning with poor participation and tolerance to physical therapy session. She continues with short term memory deficits, repeatedly asking what the goal is (getting to edge of bed) after ~10s after being reminded. She requires maxAX1 and significant additional time (>15 minutes) to attempt bed mobility and tells therapist \"stop stop stop! \" After attempting to assist patient to edge of bed. Patient at this time declining further therapeutic activity. She reports pain in her L hip and low back. Blood pressure remains very soft, 88/71 at start of session, but patient asymptomatic. RN alerted.  Continue to recommend SNF level rehab upon d/c. Current Level of Function Impacting Discharge (mobility/balance): maxA bed mobility only; last session maxAx2 to bedside chair     Other factors to consider for discharge: none additional         PLAN :  Patient continues to benefit from skilled intervention to address the above impairments. Continue treatment per established plan of care. to address goals. Recommendation for discharge: (in order for the patient to meet his/her long term goals)  Therapy up to 5 days/week in SNF setting    This discharge recommendation:  Has been made in collaboration with the attending provider and/or case management    IF patient discharges home will need the following DME: to be determined (TBD)       SUBJECTIVE:   Patient stated stop stop stop.  re: during bed mobility to edge of bed     OBJECTIVE DATA SUMMARY:   Patient received supine in bed and was agreeable to participate in PT session. Patient was cleared by nursing to participate in PT session. Vitals:    03/24/23 0922   BP: (!) 88/71 - RN alerted   BP 1 Location: Right upper arm   BP Patient Position: At rest   Pulse: 99   Temp: 97.6 °F (36.4 °C)   Resp: 16   Height:    Weight:    SpO2: 95%         Critical Behavior:  Neurologic State: Sleeping  Orientation Level: Oriented X4  Cognition: Follows commands  Safety/Judgement: Decreased awareness of need for assistance, Decreased awareness of need for safety, Decreased insight into deficits, Awareness of environment  Functional Mobility Training:  Bed Mobility:  Rolling: Maximum assistance;Assist x1;Additional time  Supine to Sit: Maximum assistance;Assist x1;Additional time (unable to complete)     Scooting: Maximum assistance;Assist x1        Transfers:                                   Balance:  Sitting: Impaired; With support  Ambulation/Gait Training:                                           Therapeutic Exercises:     Pain Rating:  Patient with intermittent pain complaints prior to session; she was premedicated by RN prior to PT    Activity Tolerance:   Poor    After treatment patient left in no apparent distress:   Supine in bed, Call bell within reach, Bed / chair alarm activated, Side rails x 3, and bed in chair position    COMMUNICATION/COLLABORATION:   The patients plan of care was discussed with: Occupational therapy assistant and Registered nurse.      Gabbi Macario PT, DPT   Time Calculation: 19 mins SSKI Counseling:  I discussed with the patient the risks of SSKI including but not limited to thyroid abnormalities, metallic taste, GI upset, fever, headache, acne, arthralgias, paraesthesias, lymphadenopathy, easy bleeding, arrhythmias, and allergic reaction.

## 2023-03-26 NOTE — PROGRESS NOTES
Physician Progress Note      Precious Morris  CSN #:                  [id number]  :                       1950  ADMIT DATE:       3/21/2023 10:54 AM  DISCH DATE:        3/24/2023 5:15 PM  RESPONDING  PROVIDER #:        Cherelle Moreno MD          QUERY CARINAStow Yakov Afternoon    This patient admitted on 2023- for Pelvic fractures. RD was consulted on 2022--    If possible, please document in progress notes and discharge summary if you are evaluating and /or treating any of the following: The medical record reflects the following:  Risk Factors: Age (67), GERD, Pubic ramus fx. Clinical Indicators: 22.8 % weight loss in less than 1 yr . clinically Significant  for timeframe. Mild body fat loss and mild muscle mass loss  Treatment: Weight, I&O, RD consulted, Plan for ensure high protein daily      Thank you  DOMINIC WrenN,RN, CPHQ, CCDS, SMART  _______________________________________________    Karen Latham Criteria:  https://aspenjournals. onlinelibrary. lema. com/doi/full/10.1177/2131769649268899  Options provided:  -- Protein calorie malnutrition mild  -- Other - I will add my own diagnosis  -- Disagree - Not applicable / Not valid  -- Disagree - Clinically unable to determine / Unknown  -- Refer to Clinical Documentation Reviewer    PROVIDER RESPONSE TEXT:    This patient has mild protein calorie malnutrition.     Query created by: Gia Arriola on 3/23/2023 9:25 AM      Electronically signed by:  Cherelle Moreno MD 3/26/2023 12:12 PM

## 2023-03-27 ENCOUNTER — PATIENT OUTREACH (OUTPATIENT)
Dept: CASE MANAGEMENT | Age: 73
End: 2023-03-27

## 2023-04-03 ENCOUNTER — PATIENT OUTREACH (OUTPATIENT)
Dept: CASE MANAGEMENT | Age: 73
End: 2023-04-03

## 2023-04-03 NOTE — PROGRESS NOTES
Transition of care outreach postponed for 7 days due to patient's discharge to [facility]. Spoke with Fito Scott, confirmed patient is still admitted, LVM with S.W. regarding possible discharge date.

## 2023-04-17 ENCOUNTER — PATIENT OUTREACH (OUTPATIENT)
Dept: CASE MANAGEMENT | Age: 73
End: 2023-04-17

## 2023-04-17 NOTE — PROGRESS NOTES
Transition of care outreach postponed for 7 days due to patient's discharge to [facility]. Spoke with Helder Duke Confirmed patient is still admitted without a discharge date.

## 2023-04-22 DIAGNOSIS — R92.1 BREAST CALCIFICATION, LEFT: Primary | ICD-10-CM

## 2023-04-22 DIAGNOSIS — R92.1 BREAST CALCIFICATION, RIGHT: ICD-10-CM

## 2023-04-23 DIAGNOSIS — R92.1 BREAST CALCIFICATION, LEFT: Primary | ICD-10-CM

## 2023-04-23 DIAGNOSIS — R92.1 BREAST CALCIFICATION, RIGHT: ICD-10-CM

## 2023-04-24 ENCOUNTER — PATIENT OUTREACH (OUTPATIENT)
Dept: CASE MANAGEMENT | Age: 73
End: 2023-04-24

## 2023-04-24 DIAGNOSIS — R92.1 BREAST CALCIFICATION, LEFT: Primary | ICD-10-CM

## 2023-04-24 DIAGNOSIS — R92.1 BREAST CALCIFICATION, RIGHT: ICD-10-CM

## 2023-04-24 NOTE — PROGRESS NOTES
Transition of care outreach postponed for 7 days due to patient's discharge to [facility].    Spoke with Meghan Mcbride, confirmed patient is still admitted with a discharge date of 5/6/2023

## 2023-04-28 DIAGNOSIS — R92.1 BREAST CALCIFICATION, RIGHT: ICD-10-CM

## 2023-04-28 DIAGNOSIS — R92.1 BREAST CALCIFICATION, LEFT: Primary | ICD-10-CM

## 2023-04-28 DIAGNOSIS — R92.2 DENSE BREAST TISSUE: ICD-10-CM

## 2023-05-01 RX ORDER — NALTREXONE HYDROCHLORIDE 50 MG/1
50 TABLET, FILM COATED ORAL DAILY
COMMUNITY

## 2023-05-01 RX ORDER — BUSPIRONE HYDROCHLORIDE 30 MG/1
30 TABLET ORAL 2 TIMES DAILY
COMMUNITY

## 2023-05-01 RX ORDER — PSEUDOEPHEDRINE HCL 30 MG
100 TABLET ORAL PRN
COMMUNITY

## 2023-05-03 ENCOUNTER — PATIENT OUTREACH (OUTPATIENT)
Dept: CASE MANAGEMENT | Age: 73
End: 2023-05-03

## 2023-05-18 ENCOUNTER — APPOINTMENT (OUTPATIENT)
Facility: HOSPITAL | Age: 73
End: 2023-05-18
Payer: MEDICARE

## 2023-05-18 ENCOUNTER — HOSPITAL ENCOUNTER (EMERGENCY)
Facility: HOSPITAL | Age: 73
Discharge: HOME | End: 2023-05-18
Attending: EMERGENCY MEDICINE
Payer: MEDICARE

## 2023-05-18 VITALS
RESPIRATION RATE: 18 BRPM | WEIGHT: 118 LBS | HEART RATE: 98 BPM | DIASTOLIC BLOOD PRESSURE: 73 MMHG | TEMPERATURE: 97.8 F | HEIGHT: 66 IN | OXYGEN SATURATION: 98 % | SYSTOLIC BLOOD PRESSURE: 120 MMHG | BODY MASS INDEX: 18.96 KG/M2

## 2023-05-18 DIAGNOSIS — R10.13 ABDOMINAL PAIN, EPIGASTRIC: ICD-10-CM

## 2023-05-18 DIAGNOSIS — R11.2 NAUSEA AND VOMITING, UNSPECIFIED VOMITING TYPE: Primary | ICD-10-CM

## 2023-05-18 LAB
ALBUMIN SERPL-MCNC: 3.4 G/DL (ref 3.5–5)
ALBUMIN/GLOB SERPL: 1 (ref 1.1–2.2)
ALP SERPL-CCNC: 67 U/L (ref 45–117)
ALT SERPL-CCNC: 13 U/L (ref 12–78)
ANION GAP SERPL CALC-SCNC: 5 MMOL/L (ref 5–15)
APPEARANCE UR: CLEAR
AST SERPL-CCNC: 14 U/L (ref 15–37)
BACTERIA URNS QL MICRO: NEGATIVE /HPF
BASOPHILS # BLD: 0 K/UL (ref 0–0.1)
BASOPHILS NFR BLD: 0 % (ref 0–1)
BILIRUB SERPL-MCNC: 0.6 MG/DL (ref 0.2–1)
BILIRUB UR QL: NEGATIVE
BUN SERPL-MCNC: 9 MG/DL (ref 6–20)
BUN/CREAT SERPL: 15 (ref 12–20)
CALCIUM SERPL-MCNC: 8.9 MG/DL (ref 8.5–10.1)
CHLORIDE SERPL-SCNC: 108 MMOL/L (ref 97–108)
CO2 SERPL-SCNC: 22 MMOL/L (ref 21–32)
COLOR UR: NORMAL
COMMENT:: NORMAL
CREAT SERPL-MCNC: 0.6 MG/DL (ref 0.55–1.02)
DIFFERENTIAL METHOD BLD: NORMAL
EOSINOPHIL # BLD: 0 K/UL (ref 0–0.4)
EOSINOPHIL NFR BLD: 0 % (ref 0–7)
EPITH CASTS URNS QL MICRO: NORMAL /LPF
ERYTHROCYTE [DISTWIDTH] IN BLOOD BY AUTOMATED COUNT: 12.8 % (ref 11.5–14.5)
GLOBULIN SER CALC-MCNC: 3.5 G/DL (ref 2–4)
GLUCOSE SERPL-MCNC: 139 MG/DL (ref 65–100)
GLUCOSE UR STRIP.AUTO-MCNC: NEGATIVE MG/DL
HCT VFR BLD AUTO: 39.9 % (ref 35–47)
HGB BLD-MCNC: 13.3 G/DL (ref 11.5–16)
HGB UR QL STRIP: NEGATIVE
HYALINE CASTS URNS QL MICRO: NORMAL /LPF (ref 0–2)
IMM GRANULOCYTES # BLD AUTO: 0 K/UL (ref 0–0.04)
IMM GRANULOCYTES NFR BLD AUTO: 0 % (ref 0–0.5)
KETONES UR QL STRIP.AUTO: NEGATIVE MG/DL
LEUKOCYTE ESTERASE UR QL STRIP.AUTO: NEGATIVE
LIPASE SERPL-CCNC: 161 U/L (ref 73–393)
LYMPHOCYTES # BLD: 2.7 K/UL (ref 0.8–3.5)
LYMPHOCYTES NFR BLD: 28 % (ref 12–49)
MAGNESIUM SERPL-MCNC: 2 MG/DL (ref 1.6–2.4)
MCH RBC QN AUTO: 31.4 PG (ref 26–34)
MCHC RBC AUTO-ENTMCNC: 33.3 G/DL (ref 30–36.5)
MCV RBC AUTO: 94.3 FL (ref 80–99)
MONOCYTES # BLD: 0.6 K/UL (ref 0–1)
MONOCYTES NFR BLD: 6 % (ref 5–13)
NEUTS SEG # BLD: 6.2 K/UL (ref 1.8–8)
NEUTS SEG NFR BLD: 66 % (ref 32–75)
NITRITE UR QL STRIP.AUTO: NEGATIVE
NRBC # BLD: 0 K/UL (ref 0–0.01)
NRBC BLD-RTO: 0 PER 100 WBC
PH UR STRIP: 7.5 (ref 5–8)
PLATELET # BLD AUTO: 245 K/UL (ref 150–400)
PMV BLD AUTO: 9.9 FL (ref 8.9–12.9)
POTASSIUM SERPL-SCNC: 4.1 MMOL/L (ref 3.5–5.1)
PROT SERPL-MCNC: 6.9 G/DL (ref 6.4–8.2)
PROT UR STRIP-MCNC: NEGATIVE MG/DL
RBC # BLD AUTO: 4.23 M/UL (ref 3.8–5.2)
RBC #/AREA URNS HPF: NORMAL /HPF (ref 0–5)
SODIUM SERPL-SCNC: 135 MMOL/L (ref 136–145)
SP GR UR REFRACTOMETRY: 1.02 (ref 1–1.03)
SPECIMEN HOLD: NORMAL
SPECIMEN HOLD: NORMAL
TROPONIN I SERPL HS-MCNC: <4 NG/L (ref 0–51)
UROBILINOGEN UR QL STRIP.AUTO: 1 EU/DL (ref 0.2–1)
WBC # BLD AUTO: 9.7 K/UL (ref 3.6–11)
WBC URNS QL MICRO: NORMAL /HPF (ref 0–4)

## 2023-05-18 PROCEDURE — 93005 ELECTROCARDIOGRAM TRACING: CPT | Performed by: EMERGENCY MEDICINE

## 2023-05-18 PROCEDURE — 99285 EMERGENCY DEPT VISIT HI MDM: CPT

## 2023-05-18 PROCEDURE — A4216 STERILE WATER/SALINE, 10 ML: HCPCS | Performed by: EMERGENCY MEDICINE

## 2023-05-18 PROCEDURE — 85025 COMPLETE CBC W/AUTO DIFF WBC: CPT

## 2023-05-18 PROCEDURE — 96375 TX/PRO/DX INJ NEW DRUG ADDON: CPT

## 2023-05-18 PROCEDURE — 6360000004 HC RX CONTRAST MEDICATION: Performed by: EMERGENCY MEDICINE

## 2023-05-18 PROCEDURE — 81001 URINALYSIS AUTO W/SCOPE: CPT

## 2023-05-18 PROCEDURE — 6360000002 HC RX W HCPCS: Performed by: EMERGENCY MEDICINE

## 2023-05-18 PROCEDURE — 83735 ASSAY OF MAGNESIUM: CPT

## 2023-05-18 PROCEDURE — 2500000003 HC RX 250 WO HCPCS: Performed by: EMERGENCY MEDICINE

## 2023-05-18 PROCEDURE — 83690 ASSAY OF LIPASE: CPT

## 2023-05-18 PROCEDURE — 96374 THER/PROPH/DIAG INJ IV PUSH: CPT

## 2023-05-18 PROCEDURE — 36415 COLL VENOUS BLD VENIPUNCTURE: CPT

## 2023-05-18 PROCEDURE — 84484 ASSAY OF TROPONIN QUANT: CPT

## 2023-05-18 PROCEDURE — 2580000003 HC RX 258: Performed by: EMERGENCY MEDICINE

## 2023-05-18 PROCEDURE — 74177 CT ABD & PELVIS W/CONTRAST: CPT

## 2023-05-18 PROCEDURE — 80053 COMPREHEN METABOLIC PANEL: CPT

## 2023-05-18 RX ORDER — MORPHINE SULFATE 4 MG/ML
4 INJECTION, SOLUTION INTRAMUSCULAR; INTRAVENOUS
Status: COMPLETED | OUTPATIENT
Start: 2023-05-18 | End: 2023-05-18

## 2023-05-18 RX ORDER — SUCRALFATE 1 G/1
1 TABLET ORAL 4 TIMES DAILY
Qty: 12 TABLET | Refills: 0 | Status: SHIPPED | OUTPATIENT
Start: 2023-05-18 | End: 2023-05-21

## 2023-05-18 RX ORDER — MORPHINE SULFATE 4 MG/ML
4 INJECTION, SOLUTION INTRAMUSCULAR; INTRAVENOUS
Status: DISCONTINUED | OUTPATIENT
Start: 2023-05-18 | End: 2023-05-18

## 2023-05-18 RX ORDER — HYDROCODONE BITARTRATE AND ACETAMINOPHEN 5; 325 MG/1; MG/1
1 TABLET ORAL EVERY 6 HOURS PRN
Qty: 10 TABLET | Refills: 0 | Status: SHIPPED | OUTPATIENT
Start: 2023-05-18 | End: 2023-05-21

## 2023-05-18 RX ORDER — ONDANSETRON 4 MG/1
4 TABLET, FILM COATED ORAL EVERY 8 HOURS PRN
Qty: 10 TABLET | Refills: 0 | Status: SHIPPED | OUTPATIENT
Start: 2023-05-18

## 2023-05-18 RX ORDER — 0.9 % SODIUM CHLORIDE 0.9 %
1000 INTRAVENOUS SOLUTION INTRAVENOUS ONCE
Status: COMPLETED | OUTPATIENT
Start: 2023-05-18 | End: 2023-05-18

## 2023-05-18 RX ORDER — FAMOTIDINE 20 MG/1
20 TABLET, FILM COATED ORAL 2 TIMES DAILY
Qty: 60 TABLET | Refills: 0 | Status: SHIPPED | OUTPATIENT
Start: 2023-05-18

## 2023-05-18 RX ORDER — ONDANSETRON 2 MG/ML
4 INJECTION INTRAMUSCULAR; INTRAVENOUS ONCE
Status: COMPLETED | OUTPATIENT
Start: 2023-05-18 | End: 2023-05-18

## 2023-05-18 RX ORDER — 0.9 % SODIUM CHLORIDE 0.9 %
1000 INTRAVENOUS SOLUTION INTRAVENOUS ONCE
Status: DISCONTINUED | OUTPATIENT
Start: 2023-05-18 | End: 2023-05-18

## 2023-05-18 RX ADMIN — SODIUM CHLORIDE 1000 ML: 9 INJECTION, SOLUTION INTRAVENOUS at 14:39

## 2023-05-18 RX ADMIN — ONDANSETRON 4 MG: 2 INJECTION INTRAMUSCULAR; INTRAVENOUS at 14:39

## 2023-05-18 RX ADMIN — FAMOTIDINE 20 MG: 10 INJECTION, SOLUTION INTRAVENOUS at 14:38

## 2023-05-18 RX ADMIN — MORPHINE SULFATE 4 MG: 4 INJECTION, SOLUTION INTRAMUSCULAR; INTRAVENOUS at 14:39

## 2023-05-18 RX ADMIN — IOPAMIDOL 100 ML: 755 INJECTION, SOLUTION INTRAVENOUS at 16:01

## 2023-05-18 ASSESSMENT — PAIN DESCRIPTION - LOCATION
LOCATION: ABDOMEN
LOCATION: ABDOMEN

## 2023-05-18 ASSESSMENT — PAIN SCALES - GENERAL
PAINLEVEL_OUTOF10: 6
PAINLEVEL_OUTOF10: 10

## 2023-05-18 ASSESSMENT — PAIN - FUNCTIONAL ASSESSMENT: PAIN_FUNCTIONAL_ASSESSMENT: 0-10

## 2023-05-18 ASSESSMENT — PAIN DESCRIPTION - ORIENTATION: ORIENTATION: ANTERIOR

## 2023-05-18 ASSESSMENT — PAIN DESCRIPTION - DESCRIPTORS: DESCRIPTORS: ACHING

## 2023-05-18 NOTE — ED TRIAGE NOTES
Patient states has had pain in her entire rib cage and abdomen with nausea and vomiting that began this morning. Denies blood in the vomit. Denies fever. States had a steroid shot in her shoulder yesterday. States every time she eats, she vomits.

## 2023-05-18 NOTE — ED PROVIDER NOTES
[facility] EMERGENCY DEPT  EMERGENCY DEPARTMENT ENCOUNTER      Pt Name: Hong Zimmer  MRN: [id number]  [place] 1950  Date of evaluation: 5/18/2023  Provider: Neva Rosario MD    [address] [place] [address]       Chief Complaint   Patient presents with    Rib Pain (injury)    Abdominal Pain    Emesis         HISTORY OF PRESENT ILLNESS   (Location/Symptom, Timing/Onset, Context/Setting, Quality, Duration, Modifying Factors, Severity)  Note limiting factors. This is a 78-year-old female with past no history of hyperlipidemia, high cholesterol, apparently is on Eliquis unclear reason why presented to the ER for evaluation of upper abdominal chest pain nausea vomiting. Patient reports she woke up with it and reports he was vomiting multiple times, pain is primary in the upper abdomen. Denies any dysuria symptoms, patient is actively vomiting arrival here. Denies any fevers or chills or chest pain. Received recent steroid injection in her shoulder yesterday. Patient also has occasional glass of wine about 2-3 times a week. Review of External Medical Records:     Nursing Notes were reviewed. REVIEW OF SYSTEMS    (2-9 systems for level 4, 10 or more for level 5)     Review of Systems    Except as noted above the remainder of the review of systems was reviewed and negative.        PAST MEDICAL HISTORY     Past Medical History:   Diagnosis Date    Adverse effect of anesthesia     per PCP patient had bladder retention after anesthesia with skin cancer surgery, under treatment with urologist. 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

## 2023-05-19 LAB
EKG ATRIAL RATE: 86 BPM
EKG DIAGNOSIS: NORMAL
EKG P AXIS: 43 DEGREES
EKG P-R INTERVAL: 170 MS
EKG Q-T INTERVAL: 394 MS
EKG QRS DURATION: 80 MS
EKG QTC CALCULATION (BAZETT): 471 MS
EKG R AXIS: -39 DEGREES
EKG T AXIS: 29 DEGREES
EKG VENTRICULAR RATE: 86 BPM

## 2023-05-22 DIAGNOSIS — G25.0 ESSENTIAL TREMOR: Primary | ICD-10-CM

## 2023-05-24 ASSESSMENT — ENCOUNTER SYMPTOMS
NAUSEA: 1
VOMITING: 1
ABDOMINAL PAIN: 1
SHORTNESS OF BREATH: 0

## 2023-05-25 RX ORDER — TOPIRAMATE 100 MG/1
100 TABLET, FILM COATED ORAL
Qty: 30 TABLET | Refills: 0 | Status: SHIPPED | OUTPATIENT
Start: 2023-05-25

## 2023-06-15 DIAGNOSIS — G25.0 ESSENTIAL TREMOR: ICD-10-CM

## 2023-06-15 RX ORDER — TOPIRAMATE 100 MG/1
TABLET, FILM COATED ORAL
Qty: 30 TABLET | Refills: 0 | OUTPATIENT
Start: 2023-06-15

## 2023-06-26 ENCOUNTER — TELEPHONE (OUTPATIENT)
Age: 73
End: 2023-06-26

## 2023-06-26 DIAGNOSIS — G25.0 ESSENTIAL TREMOR: ICD-10-CM

## 2023-06-26 RX ORDER — TOPIRAMATE 100 MG/1
100 TABLET, FILM COATED ORAL
Qty: 90 TABLET | Refills: 0 | Status: SHIPPED | OUTPATIENT
Start: 2023-06-26

## 2023-07-25 ENCOUNTER — APPOINTMENT (OUTPATIENT)
Facility: HOSPITAL | Age: 73
End: 2023-07-25
Payer: MEDICARE

## 2023-07-25 ENCOUNTER — HOSPITAL ENCOUNTER (EMERGENCY)
Facility: HOSPITAL | Age: 73
Discharge: HOME | End: 2023-07-25
Attending: EMERGENCY MEDICINE
Payer: MEDICARE

## 2023-07-25 VITALS
TEMPERATURE: 98.1 F | SYSTOLIC BLOOD PRESSURE: 154 MMHG | WEIGHT: 106.48 LBS | RESPIRATION RATE: 16 BRPM | HEIGHT: 66 IN | DIASTOLIC BLOOD PRESSURE: 86 MMHG | OXYGEN SATURATION: 98 % | BODY MASS INDEX: 17.11 KG/M2 | HEART RATE: 88 BPM

## 2023-07-25 DIAGNOSIS — R07.9 CHEST PAIN, UNSPECIFIED TYPE: Primary | ICD-10-CM

## 2023-07-25 LAB
ALBUMIN SERPL-MCNC: 3.4 G/DL (ref 3.5–5)
ALBUMIN/GLOB SERPL: 0.9 (ref 1.1–2.2)
ALP SERPL-CCNC: 55 U/L (ref 45–117)
ALT SERPL-CCNC: 12 U/L (ref 12–78)
ANION GAP SERPL CALC-SCNC: 9 MMOL/L (ref 5–15)
AST SERPL-CCNC: 16 U/L (ref 15–37)
BASOPHILS # BLD: 0 K/UL (ref 0–0.1)
BASOPHILS NFR BLD: 1 % (ref 0–1)
BILIRUB SERPL-MCNC: 0.6 MG/DL (ref 0.2–1)
BUN SERPL-MCNC: 3 MG/DL (ref 6–20)
BUN/CREAT SERPL: 5 (ref 12–20)
CALCIUM SERPL-MCNC: 8.2 MG/DL (ref 8.5–10.1)
CHLORIDE SERPL-SCNC: 104 MMOL/L (ref 97–108)
CO2 SERPL-SCNC: 25 MMOL/L (ref 21–32)
CREAT SERPL-MCNC: 0.57 MG/DL (ref 0.55–1.02)
DIFFERENTIAL METHOD BLD: ABNORMAL
EKG ATRIAL RATE: 82 BPM
EKG DIAGNOSIS: NORMAL
EKG P AXIS: 81 DEGREES
EKG P-R INTERVAL: 160 MS
EKG Q-T INTERVAL: 414 MS
EKG QRS DURATION: 84 MS
EKG QTC CALCULATION (BAZETT): 483 MS
EKG R AXIS: -40 DEGREES
EKG T AXIS: 21 DEGREES
EKG VENTRICULAR RATE: 82 BPM
EOSINOPHIL # BLD: 0.1 K/UL (ref 0–0.4)
EOSINOPHIL NFR BLD: 1 % (ref 0–7)
ERYTHROCYTE [DISTWIDTH] IN BLOOD BY AUTOMATED COUNT: 15.4 % (ref 11.5–14.5)
GLOBULIN SER CALC-MCNC: 3.6 G/DL (ref 2–4)
GLUCOSE SERPL-MCNC: 94 MG/DL (ref 65–100)
HCT VFR BLD AUTO: 35.6 % (ref 35–47)
HGB BLD-MCNC: 12.1 G/DL (ref 11.5–16)
IMM GRANULOCYTES # BLD AUTO: 0 K/UL (ref 0–0.04)
IMM GRANULOCYTES NFR BLD AUTO: 0 % (ref 0–0.5)
LYMPHOCYTES # BLD: 2.1 K/UL (ref 0.8–3.5)
LYMPHOCYTES NFR BLD: 34 % (ref 12–49)
MAGNESIUM SERPL-MCNC: 1.6 MG/DL (ref 1.6–2.4)
MCH RBC QN AUTO: 33.3 PG (ref 26–34)
MCHC RBC AUTO-ENTMCNC: 34 G/DL (ref 30–36.5)
MCV RBC AUTO: 98.1 FL (ref 80–99)
MONOCYTES # BLD: 0.6 K/UL (ref 0–1)
MONOCYTES NFR BLD: 10 % (ref 5–13)
NEUTS SEG # BLD: 3.4 K/UL (ref 1.8–8)
NEUTS SEG NFR BLD: 54 % (ref 32–75)
NRBC # BLD: 0 K/UL (ref 0–0.01)
NRBC BLD-RTO: 0 PER 100 WBC
PLATELET # BLD AUTO: 219 K/UL (ref 150–400)
PMV BLD AUTO: 10.5 FL (ref 8.9–12.9)
POTASSIUM SERPL-SCNC: 3.5 MMOL/L (ref 3.5–5.1)
PROT SERPL-MCNC: 7 G/DL (ref 6.4–8.2)
RBC # BLD AUTO: 3.63 M/UL (ref 3.8–5.2)
SODIUM SERPL-SCNC: 138 MMOL/L (ref 136–145)
TROPONIN I SERPL HS-MCNC: 11 NG/L (ref 0–51)
WBC # BLD AUTO: 6.1 K/UL (ref 3.6–11)

## 2023-07-25 PROCEDURE — 80053 COMPREHEN METABOLIC PANEL: CPT

## 2023-07-25 PROCEDURE — 99285 EMERGENCY DEPT VISIT HI MDM: CPT

## 2023-07-25 PROCEDURE — 84484 ASSAY OF TROPONIN QUANT: CPT

## 2023-07-25 PROCEDURE — 83735 ASSAY OF MAGNESIUM: CPT

## 2023-07-25 PROCEDURE — 93005 ELECTROCARDIOGRAM TRACING: CPT | Performed by: EMERGENCY MEDICINE

## 2023-07-25 PROCEDURE — 71045 X-RAY EXAM CHEST 1 VIEW: CPT

## 2023-07-25 PROCEDURE — 93010 ELECTROCARDIOGRAM REPORT: CPT | Performed by: STUDENT IN AN ORGANIZED HEALTH CARE EDUCATION/TRAINING PROGRAM

## 2023-07-25 PROCEDURE — 85025 COMPLETE CBC W/AUTO DIFF WBC: CPT

## 2023-07-25 PROCEDURE — 36415 COLL VENOUS BLD VENIPUNCTURE: CPT

## 2023-07-25 ASSESSMENT — PAIN DESCRIPTION - ORIENTATION: ORIENTATION: LEFT;RIGHT

## 2023-07-25 ASSESSMENT — PAIN DESCRIPTION - LOCATION: LOCATION: ARM

## 2023-07-25 ASSESSMENT — PAIN SCALES - GENERAL: PAINLEVEL_OUTOF10: 4

## 2023-07-25 ASSESSMENT — PAIN - FUNCTIONAL ASSESSMENT: PAIN_FUNCTIONAL_ASSESSMENT: 0-10

## 2023-07-25 NOTE — ED TRIAGE NOTES
Patient presents with reports of insomnia for 2-3 nights and awoke this morning with \"prickling pinchy\" pain on upper extremities and chest. Patient reports pain is 4/10 and has subsided. Patient also reports significant weight loss. Patient has been eating 3 meals a day but smaller meals. Patient has lost 35 lbs in the last 2 years.

## 2023-07-25 NOTE — DISCHARGE INSTRUCTIONS
Please the list of Pain Management Specialist below for your convenience:    List of Pain Management Specialists:    Braulio Kirby M.D. [phone] Pain Management  Shayna Veronica M.D. [phone] Physical Medicine and Cachorro Beckman M.D. [phone] Physical Medicine and Letitia Lopez M.D. [phone] Pain Management  Miranda Rodrigues M.D. [phone] Pain Management    Dr. Nathalie Heaton, [address], Suite [facility], [address] [facility]  [phone]    Pain Management Center                            PERCY Kee MD DO  [address], [address] [place] [address],[facility]. [address]  [place], [facility], [address] [facility]  [phone]    Dr. Paula Tolliver [facility]                                   [address] [facility], [address]  [phone]                                                                            Dr. Sho Peng, [facility] [address] [facility]  [address] [facility]                                           [address] [facility], [address] [facility], [address]  www. [email or web address]                                            [phone]

## 2023-07-25 NOTE — ED NOTES
The patient was discharged home by provider in stable condition. The patient is alert and oriented, in no respiratory distress. The patient's diagnosis, condition and treatment were explained. The patient expressed understanding. No prescriptions given. No work/school note given. A discharge plan has been developed. A  was not involved in the process. Aftercare instructions were given. Pt discharged from the ED via w/c with family.        Iesha Gunderson RN  07/25/23 6031

## 2023-09-20 DIAGNOSIS — G25.0 ESSENTIAL TREMOR: ICD-10-CM

## 2023-10-02 RX ORDER — TOPIRAMATE 100 MG/1
100 TABLET, FILM COATED ORAL
Qty: 90 TABLET | Refills: 0 | Status: SHIPPED | OUTPATIENT
Start: 2023-10-02

## 2023-10-11 ENCOUNTER — OFFICE VISIT (OUTPATIENT)
Age: 73
End: 2023-10-11
Payer: MEDICARE

## 2023-10-11 VITALS
RESPIRATION RATE: 18 BRPM | DIASTOLIC BLOOD PRESSURE: 69 MMHG | SYSTOLIC BLOOD PRESSURE: 148 MMHG | OXYGEN SATURATION: 99 % | HEART RATE: 92 BPM

## 2023-10-11 DIAGNOSIS — G25.0 ESSENTIAL TREMOR: ICD-10-CM

## 2023-10-11 PROCEDURE — 3017F COLORECTAL CA SCREEN DOC REV: CPT | Performed by: PSYCHIATRY & NEUROLOGY

## 2023-10-11 PROCEDURE — 4004F PT TOBACCO SCREEN RCVD TLK: CPT | Performed by: PSYCHIATRY & NEUROLOGY

## 2023-10-11 PROCEDURE — 1090F PRES/ABSN URINE INCON ASSESS: CPT | Performed by: PSYCHIATRY & NEUROLOGY

## 2023-10-11 PROCEDURE — G8419 CALC BMI OUT NRM PARAM NOF/U: HCPCS | Performed by: PSYCHIATRY & NEUROLOGY

## 2023-10-11 PROCEDURE — G8399 PT W/DXA RESULTS DOCUMENT: HCPCS | Performed by: PSYCHIATRY & NEUROLOGY

## 2023-10-11 PROCEDURE — 1123F ACP DISCUSS/DSCN MKR DOCD: CPT | Performed by: PSYCHIATRY & NEUROLOGY

## 2023-10-11 PROCEDURE — G8484 FLU IMMUNIZE NO ADMIN: HCPCS | Performed by: PSYCHIATRY & NEUROLOGY

## 2023-10-11 PROCEDURE — 99214 OFFICE O/P EST MOD 30 MIN: CPT | Performed by: PSYCHIATRY & NEUROLOGY

## 2023-10-11 PROCEDURE — G8428 CUR MEDS NOT DOCUMENT: HCPCS | Performed by: PSYCHIATRY & NEUROLOGY

## 2023-10-11 RX ORDER — TOPIRAMATE 100 MG/1
100 TABLET, FILM COATED ORAL
Qty: 90 TABLET | Refills: 3 | Status: SHIPPED | OUTPATIENT
Start: 2023-10-11

## 2023-10-11 RX ORDER — LIDOCAINE 50 MG/G
PATCH TOPICAL
COMMUNITY
Start: 2023-10-03

## 2023-10-11 RX ORDER — HYDROXYZINE HYDROCHLORIDE 25 MG/1
TABLET, FILM COATED ORAL
COMMUNITY
Start: 2023-09-20

## 2023-10-11 NOTE — PROGRESS NOTES
[address] [facility] Neurology Clinics and [address] Alexis at [facility] Neurology Clinics at [address], [address] [place] [address]   [phone]              Chief Complaint   Patient presents with    Follow-up     Patient is here following up for tremors and RLS . Patient reports that things are pretty good. Current Outpatient Medications   Medication Sig Dispense Refill    hydrOXYzine HCl (ATARAX) 25 MG tablet TAKE 1/2 TO 1 (ONE-HALF TO ONE) TABLET BY MOUTH ONCE DAILY AS NEEDED FOR ANXIETY      lidocaine (LIDODERM) 5 % APPLY ONE PATCH TO THE AFFECTED AREA FOR 12 HOURS THEN REMOVE THE PATCH FOR 12 HOURS .  TWELVE HOURS ON TWELVE HOURS OFF      topiramate (TOPAMAX) 100 MG tablet Take 1 tablet by mouth nightly 90 tablet 0    apixaban (ELIQUIS) 5 MG TABS tablet Take 1 tablet by mouth 2 times daily      gabapentin (NEURONTIN) 300 MG capsule TAKE 1 CAPSULE BY MOUTH THREE TIMES DAILY      latanoprost (XALATAN) 0.005 % ophthalmic solution Apply 1 drop to eye      lisinopril (PRINIVIL;ZESTRIL) 10 MG tablet Take 0.5 tablets by mouth daily      rosuvastatin (CRESTOR) 10 MG tablet Take 1 tablet by mouth      traZODone (DESYREL) 100 MG tablet Take 1 tablet by mouth      famotidine (PEPCID) 20 MG tablet Take 1 tablet by mouth 2 times daily (Patient not taking: Reported on 10/11/2023) 60 tablet 0    sucralfate (CARAFATE) 1 GM tablet Take 1 tablet by mouth 4 times daily for 3 days 12 tablet 0    ondansetron (ZOFRAN) 4 MG tablet Take 1 tablet by mouth every 8 hours as needed for Nausea or Vomiting (Patient not taking: Reported on 10/11/2023) 10 tablet 0    busPIRone (BUSPAR) 30 MG tablet Take 30 mg by mouth 2 times daily (Patient not taking: Reported on 10/11/2023)      docusate (COLACE, DULCOLAX) 100 MG CAPS Take 100 mg by mouth as needed (Patient not taking: Reported on 10/11/2023)      naltrexone (DEPADE) 50 MG tablet Take

## 2023-11-21 ENCOUNTER — TELEPHONE (OUTPATIENT)
Age: 73
End: 2023-11-21

## 2024-01-30 ENCOUNTER — HOSPITAL ENCOUNTER (OUTPATIENT)
Facility: HOSPITAL | Age: 74
Discharge: HOME | End: 2024-02-02
Attending: FAMILY MEDICINE
Payer: MEDICARE

## 2024-01-30 DIAGNOSIS — S22.32XA CLOSED FRACTURE OF ONE RIB OF LEFT SIDE, INITIAL ENCOUNTER: ICD-10-CM

## 2024-01-30 PROCEDURE — 71250 CT THORAX DX C-: CPT

## 2024-02-21 ENCOUNTER — HOSPITAL ENCOUNTER (OUTPATIENT)
Facility: HOSPITAL | Age: 74
Discharge: HOME | End: 2024-02-24
Attending: FAMILY MEDICINE
Payer: MEDICARE

## 2024-02-21 DIAGNOSIS — Z12.31 VISIT FOR SCREENING MAMMOGRAM: ICD-10-CM

## 2024-02-21 PROCEDURE — 77063 BREAST TOMOSYNTHESIS BI: CPT

## 2024-03-26 ENCOUNTER — TRANSCRIBE ORDERS (OUTPATIENT)
Facility: HOSPITAL | Age: 74
End: 2024-03-26

## 2024-03-26 DIAGNOSIS — S32.020A COMPRESSION FRACTURE OF L2 VERTEBRA, INITIAL ENCOUNTER (HCC): ICD-10-CM

## 2024-03-26 DIAGNOSIS — Z98.890 S/P KYPHOPLASTY: ICD-10-CM

## 2024-03-26 DIAGNOSIS — M81.0 SENILE OSTEOPOROSIS: ICD-10-CM

## 2024-03-26 DIAGNOSIS — M54.50 LOW BACK PAIN WITHOUT SCIATICA, UNSPECIFIED BACK PAIN LATERALITY, UNSPECIFIED CHRONICITY: Primary | ICD-10-CM

## 2024-04-04 ENCOUNTER — HOSPITAL ENCOUNTER (OUTPATIENT)
Facility: HOSPITAL | Age: 74
Discharge: HOME | End: 2024-04-04
Attending: PHYSICAL MEDICINE & REHABILITATION
Payer: MEDICARE

## 2024-04-04 DIAGNOSIS — Z98.890 S/P KYPHOPLASTY: ICD-10-CM

## 2024-04-04 DIAGNOSIS — M54.50 LOW BACK PAIN WITHOUT SCIATICA, UNSPECIFIED BACK PAIN LATERALITY, UNSPECIFIED CHRONICITY: ICD-10-CM

## 2024-04-04 DIAGNOSIS — M81.0 SENILE OSTEOPOROSIS: ICD-10-CM

## 2024-04-04 DIAGNOSIS — S32.020A COMPRESSION FRACTURE OF L2 VERTEBRA, INITIAL ENCOUNTER (HCC): ICD-10-CM

## 2024-04-04 PROCEDURE — 72148 MRI LUMBAR SPINE W/O DYE: CPT

## 2024-10-01 DIAGNOSIS — G25.0 ESSENTIAL TREMOR: ICD-10-CM

## 2024-10-01 RX ORDER — TOPIRAMATE 100 MG/1
100 TABLET, FILM COATED ORAL
Qty: 30 TABLET | Refills: 0 | Status: SHIPPED | OUTPATIENT
Start: 2024-10-01 | End: 2024-10-03 | Stop reason: ALTCHOICE

## 2024-10-03 ENCOUNTER — OFFICE VISIT (OUTPATIENT)
Age: 74
End: 2024-10-03
Payer: MEDICARE

## 2024-10-03 VITALS
BODY MASS INDEX: 17.04 KG/M2 | TEMPERATURE: 97.5 F | HEIGHT: 66 IN | HEART RATE: 92 BPM | WEIGHT: 106 LBS | SYSTOLIC BLOOD PRESSURE: 121 MMHG | OXYGEN SATURATION: 99 % | RESPIRATION RATE: 17 BRPM | DIASTOLIC BLOOD PRESSURE: 64 MMHG

## 2024-10-03 DIAGNOSIS — G25.0 ESSENTIAL TREMOR: Primary | ICD-10-CM

## 2024-10-03 PROCEDURE — 1036F TOBACCO NON-USER: CPT | Performed by: PSYCHIATRY & NEUROLOGY

## 2024-10-03 PROCEDURE — 3017F COLORECTAL CA SCREEN DOC REV: CPT | Performed by: PSYCHIATRY & NEUROLOGY

## 2024-10-03 PROCEDURE — G8419 CALC BMI OUT NRM PARAM NOF/U: HCPCS | Performed by: PSYCHIATRY & NEUROLOGY

## 2024-10-03 PROCEDURE — G8428 CUR MEDS NOT DOCUMENT: HCPCS | Performed by: PSYCHIATRY & NEUROLOGY

## 2024-10-03 PROCEDURE — G8484 FLU IMMUNIZE NO ADMIN: HCPCS | Performed by: PSYCHIATRY & NEUROLOGY

## 2024-10-03 PROCEDURE — G8399 PT W/DXA RESULTS DOCUMENT: HCPCS | Performed by: PSYCHIATRY & NEUROLOGY

## 2024-10-03 PROCEDURE — 1090F PRES/ABSN URINE INCON ASSESS: CPT | Performed by: PSYCHIATRY & NEUROLOGY

## 2024-10-03 PROCEDURE — 1123F ACP DISCUSS/DSCN MKR DOCD: CPT | Performed by: PSYCHIATRY & NEUROLOGY

## 2024-10-03 PROCEDURE — 99214 OFFICE O/P EST MOD 30 MIN: CPT | Performed by: PSYCHIATRY & NEUROLOGY

## 2024-10-03 NOTE — PROGRESS NOTES
Cherelle Russell is a 74 y.o. female    Chief Complaint   Patient presents with    Tremors     6 month follow up. Patient states leg tremors are doing alright. Medication Trazadone is helping with sleep.     Vitals:    10/03/24 1304   BP: 121/64   Pulse: 92   Resp: 17   Temp: 97.5 °F (36.4 °C)   SpO2: 99%           Health Maintenance Due   Topic Date Due    Lipids  Never done    Depression Screen  Never done    Hepatitis C screen  Never done    Colorectal Cancer Screen  Never done    Respiratory Syncytial Virus (RSV) Pregnant or age 60 yrs+ (1 - 1-dose 60+ series) Never done    Pneumococcal 65+ years Vaccine (1 of 1 - PCV) Never done    Shingles vaccine (2 of 3) 04/02/2020    Annual Wellness Visit (Medicare)  11/27/2023    Flu vaccine (1) Never done    COVID-19 Vaccine (1 - 2023-24 season) Never done         \"Have you been to the ER, urgent care clinic since your last visit?  Hospitalized since your last visit?\"    No     “Have you seen or consulted any other health care providers outside of [facility] since your last visit?”    No     “Have you had a colorectal cancer screening such as a colonoscopy/FIT/Cologuard?    No     No colonoscopy on file  No cologuard on file  No FIT/FOBT on file   No flexible sigmoidoscopy on file

## 2024-10-03 NOTE — PROGRESS NOTES
[facility]rology Clinics and [facility] at [facility] Neurology Clinics at [address] [place] [address] [address] [facility] [address] [place] [address] [phone]              Chief Complaint   Patient presents with    Tremors     6 month follow up. Patient states leg tremors are doing alright. Medication Trazadone is helping with sleep.     Current Outpatient Medications   Medication Sig Dispense Refill    hydrOXYzine HCl (ATARAX) 25 MG tablet TAKE 1/2 TO 1 (ONE-HALF TO ONE) TABLET BY MOUTH ONCE DAILY AS NEEDED FOR ANXIETY      lidocaine (LIDODERM) 5 % APPLY ONE PATCH TO THE AFFECTED AREA FOR 12 HOURS THEN REMOVE THE PATCH FOR 12 HOURS . TWELVE HOURS ON TWELVE HOURS OFF      apixaban (ELIQUIS) 5 MG TABS tablet Take 1 tablet by mouth 2 times daily      gabapentin (NEURONTIN) 300 MG capsule TAKE 1 CAPSULE BY MOUTH THREE TIMES DAILY      latanoprost (XALATAN) 0.005 % ophthalmic solution Apply 1 drop to eye      rosuvastatin (CRESTOR) 10 MG tablet Take 1 tablet by mouth      traZODone (DESYREL) 100 MG tablet Take 1 tablet by mouth      topiramate (TOPAMAX) 100 MG tablet Take 1 tablet by mouth nightly (Patient not taking: Reported on 10/3/2024) 30 tablet 0    sucralfate (CARAFATE) 1 GM tablet Take 1 tablet by mouth 4 times daily for 3 days 12 tablet 0    ondansetron (ZOFRAN) 4 MG tablet Take 1 tablet by mouth every 8 hours as needed for Nausea or Vomiting (Patient not taking: Reported on 10/11/2023) 10 tablet 0    naltrexone (DEPADE) 50 MG tablet Take 1 tablet by mouth daily (Patient not taking: Reported on 10/11/2023)      lisinopril (PRINIVIL;ZESTRIL) 10 MG tablet Take 0.5 tablets by mouth daily      omeprazole (PRILOSEC) 40 MG delayed release capsule Take 40 mg by mouth daily (Patient not taking: Reported on 10/11/2023)      polyethyl glycol-propyl glycol 0.4-0.3 % (SYSTANE) 0.4-0.3 % ophthalmic solution Apply 1 drop to eye as needed

## 2025-01-05 DIAGNOSIS — G25.0 ESSENTIAL TREMOR: ICD-10-CM

## 2025-01-05 RX ORDER — TOPIRAMATE 100 MG/1
100 TABLET, FILM COATED ORAL
Qty: 90 TABLET | Refills: 0 | OUTPATIENT
Start: 2025-01-05

## 2025-01-07 DIAGNOSIS — G25.0 ESSENTIAL TREMOR: ICD-10-CM

## 2025-01-07 RX ORDER — TOPIRAMATE 100 MG/1
100 TABLET, FILM COATED ORAL
Qty: 90 TABLET | Refills: 0 | OUTPATIENT
Start: 2025-01-07

## 2025-01-07 NOTE — TELEPHONE ENCOUNTER
Per 10/3/2024 office note per Dr Terrell:    Impression/Plan  74-year-old lady with essential tremor  Symptoms controlled with trazodone  Continue with trazodone and off Topamax  Leave follow-up open for now

## 2025-02-04 DIAGNOSIS — G25.0 ESSENTIAL TREMOR: ICD-10-CM

## 2025-02-04 RX ORDER — TOPIRAMATE 100 MG/1
100 TABLET, FILM COATED ORAL
Qty: 90 TABLET | Refills: 0 | OUTPATIENT
Start: 2025-02-04

## 2025-07-02 ENCOUNTER — TELEPHONE (OUTPATIENT)
Age: 75
End: 2025-07-02

## 2025-07-02 DIAGNOSIS — G25.0 ESSENTIAL TREMOR: Primary | ICD-10-CM

## 2025-07-02 NOTE — TELEPHONE ENCOUNTER
Patient requesting a call to discuss her current medical condition and also to discuss getting a follow up appt and medication refills.    She also want to discuss her  current medical condition also.

## 2025-07-03 NOTE — TELEPHONE ENCOUNTER
The patient is requesting refills on topirimate 100 mg. Confirmed [facility] pharmacy in the chart is correct. Please advise if follow up can be scheduled.

## 2025-07-07 ENCOUNTER — TELEPHONE (OUTPATIENT)
Age: 75
End: 2025-07-07

## 2025-07-07 NOTE — TELEPHONE ENCOUNTER
Requesting refill for RX topiramate (TOPAMAX) 100 MG tablet stated she is out of medication. [facility] - [place], [place] - [facility] YANICK ZELAYA [phone] - F [phone]

## 2025-07-08 DIAGNOSIS — G25.0 ESSENTIAL TREMOR: ICD-10-CM

## 2025-07-08 NOTE — TELEPHONE ENCOUNTER
Per Dr. Terrell' LOV note 10/3/24:    Impression/Plan  74-year-old lady with essential tremor  Symptoms controlled with trazodone  Continue with trazodone and off Topamax  Leave follow-up open for now     Heather Terrell MD    Last dispense date from [facility] was on 1/10/25 for 30 days 1 refill sent by Reina Altman.    LVM with pt

## 2025-07-09 NOTE — TELEPHONE ENCOUNTER
Pt states she has been off of the tpx for a while and now feels like her legs \"jump at night\".  She is going through a lot of stess with her  \"critically\" ill at the hospital now.  She would like to start back on this.    Discussed she would have to titrate back up to 100 mg since it has been a long time since taking it.  Advised since Dr. Terrell is not here and since she has pcp appt today, see if her pcp would write the titration dose Take topiramate 25 mg po nightly for 7 days then take 50 mg nightly for 7 days then take 75 mg nightly for 7 days then take 100 mg nightly thereafter since she will be out and about today and I will send a msg to Dr. Terrell that he will see upon his return tomorrow.  If pcp will start the tpx, he can write for the 100 mg maintenance dosing.

## 2025-07-09 NOTE — TELEPHONE ENCOUNTER
Patient calling back about her refill request for Topiramate 100 mg at bedtime    She stated in a wheel chair and her transportation will be at her home today so she's requesting to please have this refill request approved today.    The pharmacy is [facility] at [address] [place] [address].

## 2025-07-11 RX ORDER — TOPIRAMATE 100 MG/1
100 TABLET, FILM COATED ORAL
Qty: 90 TABLET | Refills: 0 | OUTPATIENT
Start: 2025-07-11

## 2025-07-11 RX ORDER — TOPIRAMATE 25 MG/1
TABLET, FILM COATED ORAL
Qty: 70 TABLET | Refills: 0 | Status: SHIPPED | OUTPATIENT
Start: 2025-07-11 | End: 2025-08-08

## 2025-07-11 RX ORDER — TOPIRAMATE 100 MG/1
100 TABLET, FILM COATED ORAL NIGHTLY
Qty: 90 TABLET | Refills: 1 | Status: SHIPPED | OUTPATIENT
Start: 2025-08-01

## 2025-07-11 NOTE — TELEPHONE ENCOUNTER
S/w Mrs. Russell, she saw a NP at her pcp's practice and was told \"he did not feel comfortable writing a prescription\"  sent tpx titration and maintenance doses per VO Dr. Terrell.  Discussed instructions.  Pt will have her aid p/u from [facility] today and expresses gratitude for our assistance.  She says her  who is also a pt of Dr. Terrell will be d/c from hospital today and decided to go in to hospice.  Will update Dr. Terrell in pt's chart.